# Patient Record
Sex: FEMALE | Race: WHITE | NOT HISPANIC OR LATINO | URBAN - METROPOLITAN AREA
[De-identification: names, ages, dates, MRNs, and addresses within clinical notes are randomized per-mention and may not be internally consistent; named-entity substitution may affect disease eponyms.]

---

## 2017-05-26 ENCOUNTER — EMERGENCY (EMERGENCY)
Facility: HOSPITAL | Age: 77
LOS: 1 days | Discharge: HOME | End: 2017-05-26
Attending: EMERGENCY MEDICINE | Admitting: EMERGENCY MEDICINE
Payer: MEDICARE

## 2017-05-26 VITALS
HEART RATE: 75 BPM | TEMPERATURE: 98 F | OXYGEN SATURATION: 99 % | RESPIRATION RATE: 18 BRPM | SYSTOLIC BLOOD PRESSURE: 120 MMHG | DIASTOLIC BLOOD PRESSURE: 56 MMHG

## 2017-05-26 VITALS
SYSTOLIC BLOOD PRESSURE: 123 MMHG | TEMPERATURE: 99 F | RESPIRATION RATE: 16 BRPM | OXYGEN SATURATION: 98 % | HEART RATE: 83 BPM | DIASTOLIC BLOOD PRESSURE: 73 MMHG

## 2017-05-26 DIAGNOSIS — R10.32 LEFT LOWER QUADRANT PAIN: ICD-10-CM

## 2017-05-26 LAB
ALBUMIN SERPL ELPH-MCNC: 4.3 G/DL — SIGNIFICANT CHANGE UP (ref 3.3–5)
ALP SERPL-CCNC: 46 U/L — SIGNIFICANT CHANGE UP (ref 40–120)
ALT FLD-CCNC: 12 U/L RC — SIGNIFICANT CHANGE UP (ref 10–45)
ANION GAP SERPL CALC-SCNC: 17 MMOL/L — SIGNIFICANT CHANGE UP (ref 5–17)
APPEARANCE UR: CLEAR — SIGNIFICANT CHANGE UP
AST SERPL-CCNC: 16 U/L — SIGNIFICANT CHANGE UP (ref 10–40)
BASOPHILS # BLD AUTO: 0 K/UL — SIGNIFICANT CHANGE UP (ref 0–0.2)
BASOPHILS NFR BLD AUTO: 0.2 % — SIGNIFICANT CHANGE UP (ref 0–2)
BILIRUB SERPL-MCNC: 1.8 MG/DL — HIGH (ref 0.2–1.2)
BILIRUB UR-MCNC: NEGATIVE — SIGNIFICANT CHANGE UP
BUN SERPL-MCNC: 21 MG/DL — SIGNIFICANT CHANGE UP (ref 7–23)
CALCIUM SERPL-MCNC: 9.6 MG/DL — SIGNIFICANT CHANGE UP (ref 8.4–10.5)
CHLORIDE SERPL-SCNC: 97 MMOL/L — SIGNIFICANT CHANGE UP (ref 96–108)
CO2 SERPL-SCNC: 22 MMOL/L — SIGNIFICANT CHANGE UP (ref 22–31)
COLOR SPEC: SIGNIFICANT CHANGE UP
CREAT SERPL-MCNC: 1.37 MG/DL — HIGH (ref 0.5–1.3)
DIFF PNL FLD: ABNORMAL
EOSINOPHIL # BLD AUTO: 0.1 K/UL — SIGNIFICANT CHANGE UP (ref 0–0.5)
EOSINOPHIL NFR BLD AUTO: 0.6 % — SIGNIFICANT CHANGE UP (ref 0–6)
GAS PNL BLDV: SIGNIFICANT CHANGE UP
GLUCOSE SERPL-MCNC: 92 MG/DL — SIGNIFICANT CHANGE UP (ref 70–99)
GLUCOSE UR QL: NEGATIVE — SIGNIFICANT CHANGE UP
HCT VFR BLD CALC: 31 % — LOW (ref 34.5–45)
HGB BLD-MCNC: 10.9 G/DL — LOW (ref 11.5–15.5)
KETONES UR-MCNC: ABNORMAL
LEUKOCYTE ESTERASE UR-ACNC: NEGATIVE — SIGNIFICANT CHANGE UP
LYMPHOCYTES # BLD AUTO: 19.6 % — SIGNIFICANT CHANGE UP (ref 13–44)
LYMPHOCYTES # BLD AUTO: 2 K/UL — SIGNIFICANT CHANGE UP (ref 1–3.3)
MCHC RBC-ENTMCNC: 35.1 GM/DL — SIGNIFICANT CHANGE UP (ref 32–36)
MCHC RBC-ENTMCNC: 36.7 PG — HIGH (ref 27–34)
MCV RBC AUTO: 105 FL — HIGH (ref 80–100)
MONOCYTES # BLD AUTO: 0.7 K/UL — SIGNIFICANT CHANGE UP (ref 0–0.9)
MONOCYTES NFR BLD AUTO: 7.2 % — SIGNIFICANT CHANGE UP (ref 2–14)
NEUTROPHILS # BLD AUTO: 7.2 K/UL — SIGNIFICANT CHANGE UP (ref 1.8–7.4)
NEUTROPHILS NFR BLD AUTO: 72.4 % — SIGNIFICANT CHANGE UP (ref 43–77)
NITRITE UR-MCNC: NEGATIVE — SIGNIFICANT CHANGE UP
PH UR: 5.5 — SIGNIFICANT CHANGE UP (ref 5–8)
PLATELET # BLD AUTO: 253 K/UL — SIGNIFICANT CHANGE UP (ref 150–400)
POTASSIUM SERPL-MCNC: 4 MMOL/L — SIGNIFICANT CHANGE UP (ref 3.5–5.3)
POTASSIUM SERPL-SCNC: 4 MMOL/L — SIGNIFICANT CHANGE UP (ref 3.5–5.3)
PROT SERPL-MCNC: 7.2 G/DL — SIGNIFICANT CHANGE UP (ref 6–8.3)
PROT UR-MCNC: NEGATIVE — SIGNIFICANT CHANGE UP
RBC # BLD: 2.97 M/UL — LOW (ref 3.8–5.2)
RBC # FLD: 14.3 % — SIGNIFICANT CHANGE UP (ref 10.3–14.5)
RBC CASTS # UR COMP ASSIST: SIGNIFICANT CHANGE UP /HPF (ref 0–2)
SODIUM SERPL-SCNC: 136 MMOL/L — SIGNIFICANT CHANGE UP (ref 135–145)
SP GR SPEC: 1.01 — LOW (ref 1.01–1.02)
UROBILINOGEN FLD QL: NEGATIVE — SIGNIFICANT CHANGE UP
WBC # BLD: 10 K/UL — SIGNIFICANT CHANGE UP (ref 3.8–10.5)
WBC # FLD AUTO: 10 K/UL — SIGNIFICANT CHANGE UP (ref 3.8–10.5)
WBC UR QL: SIGNIFICANT CHANGE UP /HPF (ref 0–5)

## 2017-05-26 PROCEDURE — 71010: CPT | Mod: 26

## 2017-05-26 PROCEDURE — 82435 ASSAY OF BLOOD CHLORIDE: CPT

## 2017-05-26 PROCEDURE — 99284 EMERGENCY DEPT VISIT MOD MDM: CPT

## 2017-05-26 PROCEDURE — 83690 ASSAY OF LIPASE: CPT

## 2017-05-26 PROCEDURE — 85027 COMPLETE CBC AUTOMATED: CPT

## 2017-05-26 PROCEDURE — 82803 BLOOD GASES ANY COMBINATION: CPT

## 2017-05-26 PROCEDURE — 80053 COMPREHEN METABOLIC PANEL: CPT

## 2017-05-26 PROCEDURE — 87086 URINE CULTURE/COLONY COUNT: CPT

## 2017-05-26 PROCEDURE — 74177 CT ABD & PELVIS W/CONTRAST: CPT

## 2017-05-26 PROCEDURE — 82947 ASSAY GLUCOSE BLOOD QUANT: CPT

## 2017-05-26 PROCEDURE — 81001 URINALYSIS AUTO W/SCOPE: CPT

## 2017-05-26 PROCEDURE — 83605 ASSAY OF LACTIC ACID: CPT

## 2017-05-26 PROCEDURE — 84132 ASSAY OF SERUM POTASSIUM: CPT

## 2017-05-26 PROCEDURE — 99284 EMERGENCY DEPT VISIT MOD MDM: CPT | Mod: 25

## 2017-05-26 PROCEDURE — 82330 ASSAY OF CALCIUM: CPT

## 2017-05-26 PROCEDURE — 74177 CT ABD & PELVIS W/CONTRAST: CPT | Mod: 26

## 2017-05-26 PROCEDURE — 85014 HEMATOCRIT: CPT

## 2017-05-26 PROCEDURE — 84295 ASSAY OF SERUM SODIUM: CPT

## 2017-05-26 PROCEDURE — 71045 X-RAY EXAM CHEST 1 VIEW: CPT

## 2017-05-26 RX ORDER — SODIUM CHLORIDE 9 MG/ML
1000 INJECTION INTRAMUSCULAR; INTRAVENOUS; SUBCUTANEOUS ONCE
Qty: 0 | Refills: 0 | Status: COMPLETED | OUTPATIENT
Start: 2017-05-26 | End: 2017-05-26

## 2017-05-26 RX ADMIN — Medication 1 TABLET(S): at 20:22

## 2017-05-26 RX ADMIN — SODIUM CHLORIDE 2000 MILLILITER(S): 9 INJECTION INTRAMUSCULAR; INTRAVENOUS; SUBCUTANEOUS at 17:19

## 2017-05-26 NOTE — ED PROVIDER NOTE - OBJECTIVE STATEMENT
76F with pmh microscopic hematuria, renal cancer s/p R nephrectomy (2009), htn, diverticulitis 10 yr ago p/w 3d h/o abdominal pain.  Pain is located LLQ, dull, non radiating, intermittent, w/w movement.  A/w decreased appetite.  Denies diarrhea, constipation, hematochezia, melena, n/v, fever, chills, cp, sob, dysuria, hematuria.  Went to  for pain 3d ago, d/w diverticulitis clinically and t/w ciproflagyl.  Here today because pain has not persisted.  PMD concerned may have perforation.      PMD: Fifi Alfaro [phone]

## 2017-05-26 NOTE — ED PROVIDER NOTE - PROGRESS NOTE DETAILS
Symptoms mild, did not require pain Rx throughout ED course.  CT A/P results noted.  Will change abx to augmentin and refer back to primary medical doctor.  Strict return precautions discussed.  D/C.  --BMM

## 2017-05-26 NOTE — ED ADULT NURSE NOTE - OBJECTIVE STATEMENT
76 y.o female presents to the ed c.o abdominal pain s.p treatment for diagnosed diverticulitis on antibiotics. pt began to have pain on tuesday, called pcp who told her to go to urgent care. diagnosed with diverticulitis, started on flagyl and cipro 500mg each since wednesday, she has taken 4 doses. pt has been eating liquids and drinking water, no solid food. patient hx of htn, hysterectomy, removed kidney in 09 for cancer, patient is unsure of the type of cancer she had. pt states she does not have abdominal pain unless the site is palpated or if she walks. denies chest pain/sob/fever/chills/n/v/d/denies urinary symptoms. family at the bedside patient is no distress, denies pain at rest. Patient undressed and placed into gown, call bell in hand and side rails up for safety. warm blanket provided, vital signs stable, pt in no acute distress.

## 2017-05-26 NOTE — ED PROVIDER NOTE - PLAN OF CARE
1) You were here for abdominal pain.    2) Take your augmentin medicine as prescribed.   3) Follow up with your primary doctor for further evaluation and to answer any questions you have.    4) Return to the emergency department if you experience worsening symptoms, pain, fever, chills, nausea, vomiting or other concerning symptoms.

## 2017-05-26 NOTE — ED PROVIDER NOTE - CARE PLAN
Principal Discharge DX:	Diverticulitis of large intestine without perforation or abscess without bleeding Principal Discharge DX:	Diverticulitis of large intestine without perforation or abscess without bleeding  Instructions for follow-up, activity and diet:	1) You were here for abdominal pain.    2) Take your augmentin medicine as prescribed.   3) Follow up with your primary doctor for further evaluation and to answer any questions you have.    4) Return to the emergency department if you experience worsening symptoms, pain, fever, chills, nausea, vomiting or other concerning symptoms.

## 2017-05-26 NOTE — ED ADULT TRIAGE NOTE - CHIEF COMPLAINT QUOTE
LLQ abd pain since tuesday went to her PMD and sent here to r/o diverticulitis or perf patient already on flagyl and cipro from urgent care

## 2017-05-26 NOTE — ED ADULT NURSE REASSESSMENT NOTE - NS ED NURSE REASSESS COMMENT FT1
Patient aaox4 no acute distress no pain or NVD. Updated on plan of care. Awaiting dispo.
Patient aaox4 no acute distress noted. Patient d/c in stable condition. D/c plan reviewed. Reports understanding of d/c plan.
pt in ct scan.
pt is waiting for ct results, in no acute distress and family remains at the bedside. smiling and watching tv with family.     Pt resting comfortably with VSS, no complaints at this time. Patient's bed in the lowest position, explained plan of care to patient and family members. Will continue to monitor.

## 2017-05-26 NOTE — ED PROVIDER NOTE - MEDICAL DECISION MAKING DETAILS
76F with past medical history and hpi as noted.  LUQ tenderness to palpation with some guarding.  Vitals within normal limits, afebrile.  Will obtain labs, ua, uclx, ct, ivf, reeval. 76F with past medical history and hpi as noted.  LUQ tenderness to palpation with some guarding.  Vitals within normal limits, afebrile.  Will obtain labs, ua, uclx, ct, ivf, reeval.  Attending Statement: Agree with the above.  Clinical diverticulitis.  Stable patient.  Declining pain Rx.  Well appearing.  ?rigors? at home.  CT to eval for abscess/perf.  Reassess.  --BMM

## 2017-05-28 LAB
CULTURE RESULTS: NO GROWTH — SIGNIFICANT CHANGE UP
SPECIMEN SOURCE: SIGNIFICANT CHANGE UP

## 2017-07-27 RX ORDER — RAMIPRIL 5 MG
1 CAPSULE ORAL
Qty: 0 | Refills: 0 | COMMUNITY

## 2017-07-27 RX ORDER — AMLODIPINE BESYLATE 2.5 MG/1
1 TABLET ORAL
Qty: 0 | Refills: 0 | COMMUNITY

## 2019-06-04 ENCOUNTER — APPOINTMENT (OUTPATIENT)
Dept: PULMONOLOGY | Facility: CLINIC | Age: 79
End: 2019-06-04

## 2021-12-03 PROBLEM — I10 ESSENTIAL (PRIMARY) HYPERTENSION: Chronic | Status: ACTIVE | Noted: 2017-05-26

## 2021-12-03 PROBLEM — C64.9 MALIGNANT NEOPLASM OF UNSPECIFIED KIDNEY, EXCEPT RENAL PELVIS: Chronic | Status: ACTIVE | Noted: 2017-05-26

## 2021-12-08 ENCOUNTER — APPOINTMENT (OUTPATIENT)
Dept: OTOLARYNGOLOGY | Facility: CLINIC | Age: 81
End: 2021-12-08
Payer: MEDICARE

## 2021-12-08 VITALS
WEIGHT: 182 LBS | HEART RATE: 82 BPM | DIASTOLIC BLOOD PRESSURE: 82 MMHG | BODY MASS INDEX: 36.69 KG/M2 | HEIGHT: 59 IN | SYSTOLIC BLOOD PRESSURE: 132 MMHG

## 2021-12-08 DIAGNOSIS — Z86.2 PERSONAL HISTORY OF DISEASES OF THE BLOOD AND BLOOD-FORMING ORGANS AND CERTAIN DISORDERS INVOLVING THE IMMUNE MECHANISM: ICD-10-CM

## 2021-12-08 DIAGNOSIS — Z82.2 FAMILY HISTORY OF DEAFNESS AND HEARING LOSS: ICD-10-CM

## 2021-12-08 DIAGNOSIS — Z86.79 PERSONAL HISTORY OF OTHER DISEASES OF THE CIRCULATORY SYSTEM: ICD-10-CM

## 2021-12-08 DIAGNOSIS — Z78.9 OTHER SPECIFIED HEALTH STATUS: ICD-10-CM

## 2021-12-08 DIAGNOSIS — Z83.3 FAMILY HISTORY OF DIABETES MELLITUS: ICD-10-CM

## 2021-12-08 DIAGNOSIS — Z80.9 FAMILY HISTORY OF MALIGNANT NEOPLASM, UNSPECIFIED: ICD-10-CM

## 2021-12-08 DIAGNOSIS — Z85.828 PERSONAL HISTORY OF OTHER MALIGNANT NEOPLASM OF SKIN: ICD-10-CM

## 2021-12-08 PROCEDURE — 99203 OFFICE O/P NEW LOW 30 MIN: CPT

## 2021-12-08 RX ORDER — MUPIROCIN 20 MG/G
2 OINTMENT TOPICAL 3 TIMES DAILY
Qty: 1 | Refills: 3 | Status: ACTIVE | COMMUNITY
Start: 2021-12-08 | End: 1900-01-01

## 2021-12-08 RX ORDER — LUSPATERCEPT 75 MG/1
INJECTION, POWDER, LYOPHILIZED, FOR SOLUTION SUBCUTANEOUS
Refills: 0 | Status: ACTIVE | COMMUNITY

## 2021-12-08 NOTE — PHYSICAL EXAM
[Hearing Silverio Test (Tuning Fork On Forehead)] : no lateralization of tone [Midline] : trachea located in midline position [Normal] : temporomandibular joint is normal [FreeTextEntry8] : cerumen removed by curettage [FreeTextEntry9] : cerumen removed by curettage [FreeTextEntry1] : no sinus tenderness, sensation intact, scab intranasally right lateral alar-removed, friability, upper lateral cartilage collapsing in [de-identified] : no tonsil tissue

## 2021-12-08 NOTE — REVIEW OF SYSTEMS
[Hearing Loss] : hearing loss [Lightheadedness] : lightheadedness [Ear Noises] : ear noises [Shortness Of Breath] : shortness of breath [Negative] : Heme/Lymph [FreeTextEntry6] : noisy breathing [de-identified] : muscle aches [FreeTextEntry1] : sweating at night

## 2021-12-08 NOTE — ASSESSMENT
[FreeTextEntry1] : Reviewed and reconciled meds, allergies, Pmhx, Famhx, Sochx, Surghx\par \par  hx of basal cell carcinoma on right side of nose . Now with right nasal sore\par \par Scab intranasally right lateral alar- removed.Friability, upper lateral cartilage collapsing in\par \par Plan\par Cerumen removed\par Will start Mupirocin TID. if does not heal will need to schedule removal \par F/u 3 weeks \par

## 2021-12-08 NOTE — HISTORY OF PRESENT ILLNESS
[de-identified] : 81 y.o female with h/o aortic stenosis. Going angiogram in 2 days and will require aortic valve replacement. She presents with a sore in right nostril. Its been there for past month or two. It did seem to crust up this week. Scab fell off which helped the pain. She had hx of basal cell carcinoma on right side of nose above where sore is. It was treated with Mohs procedure. Overall nose is a little dry and she noticed some blood in left nostril.

## 2022-02-01 ENCOUNTER — APPOINTMENT (OUTPATIENT)
Dept: OTOLARYNGOLOGY | Facility: CLINIC | Age: 82
End: 2022-02-01
Payer: MEDICARE

## 2022-02-01 VITALS
BODY MASS INDEX: 36.69 KG/M2 | DIASTOLIC BLOOD PRESSURE: 58 MMHG | WEIGHT: 182 LBS | HEIGHT: 59 IN | SYSTOLIC BLOOD PRESSURE: 106 MMHG | HEART RATE: 72 BPM

## 2022-02-01 DIAGNOSIS — H93.13 TINNITUS, BILATERAL: ICD-10-CM

## 2022-02-01 DIAGNOSIS — J34.89 OTHER SPECIFIED DISORDERS OF NOSE AND NASAL SINUSES: ICD-10-CM

## 2022-02-01 DIAGNOSIS — J98.8 OTHER SPECIFIED RESPIRATORY DISORDERS: ICD-10-CM

## 2022-02-01 DIAGNOSIS — M95.0 ACQUIRED DEFORMITY OF NOSE: ICD-10-CM

## 2022-02-01 DIAGNOSIS — R06.83 SNORING: ICD-10-CM

## 2022-02-01 DIAGNOSIS — H69.83 OTHER SPECIFIED DISORDERS OF EUSTACHIAN TUBE, BILATERAL: ICD-10-CM

## 2022-02-01 PROCEDURE — 92557 COMPREHENSIVE HEARING TEST: CPT

## 2022-02-01 PROCEDURE — 99214 OFFICE O/P EST MOD 30 MIN: CPT | Mod: 25

## 2022-02-01 PROCEDURE — 31575 DIAGNOSTIC LARYNGOSCOPY: CPT

## 2022-02-01 PROCEDURE — 92570 ACOUSTIC IMMITANCE TESTING: CPT

## 2022-02-01 PROCEDURE — 92588 EVOKED AUDITORY TST COMPLETE: CPT

## 2022-02-01 RX ORDER — CHROMIUM 200 MCG
TABLET ORAL
Refills: 0 | Status: COMPLETED | COMMUNITY
End: 2022-02-01

## 2022-02-01 RX ORDER — ASPIRIN 325 MG
81 TABLET ORAL
Refills: 0 | Status: ACTIVE | COMMUNITY

## 2022-02-01 RX ORDER — AZELASTINE HYDROCHLORIDE 137 UG/1
137 SPRAY, METERED NASAL TWICE DAILY
Qty: 1 | Refills: 5 | Status: ACTIVE | COMMUNITY
Start: 2022-02-01 | End: 1900-01-01

## 2022-02-01 NOTE — PHYSICAL EXAM
[Hearing Silverio Test (Tuning Fork On Forehead)] : no lateralization of tone [Midline] : trachea located in midline position [Normal] : no masses and lesions seen, face is symmetric [de-identified] : scarred, retracted TM. tympanosclerosis  [de-identified] : scarred, retracted TM. tympanosclerosis  [FreeTextEntry1] : where basal cell was, healed perfectly, scar from closure but no recurrence. septum midline. drop of upper lateral cartilages- right worse than left. no lesions or growths. dried crusting on right side.  [de-identified] : no tonsil tissue [de-identified] : type 3 oral cavity [FreeTextEntry2] : Sinuses nontender to percussion. Sensations intact.

## 2022-02-01 NOTE — HISTORY OF PRESENT ILLNESS
[de-identified] : The patient presents with h/o aortic stenosis s/p aortic valve replacement, basal cell carcinoma on right exterior of nose- treated with Mohs procedure, PMR- on Prednisone. Pt presents today for further evaluation of tinnitus. Pt c/o constant hissing noise in both ears and an intermittent pulsation sound in both ears, both of which have been occurring for years. Pt states that she notices the pulsation noise most when waking up.  Pt states that blood pressure is controlled with meds. Pt admits to snoring and possible stopping breathing while sleeping. Pt adds that her SOB has not improved after aortic valve replacement. She states that she was SOB just walking from her car into the building. Pt has not had carotid doppler done or MRI/MRA head done. Pt was last seen for a sore on inside right side of nose that resolved with Mupirocin.

## 2022-02-01 NOTE — PROCEDURE
[Unable to Cooperate with Mirror] : patient unable to cooperate with mirror [Complicated Symptoms] : complicated symptoms requiring more thorough examination than provided by mirror [None] : none [de-identified] : Procedure: Flexible Fiberoptic Laryngoscopy: Risks, benefits, and alternatives of flexible endoscopy were explained to the patient. The patient gave oral consent to proceed. The flexible scope was inserted into the left nasal cavity and advanced towards the nasopharynx. Visualized mucosa over the turbinates and septum were as describe above.  The nasopharynx was clear. Oropharyngeal walls were symmetric and mobile without lesion, mass, or edema. Hypopharynx was also without lesion or edema. Complete obstruction at the level of the soft palate. Larynx was mobile without lesions.  Edema post cricoid and intra arytenoid.  Epiglottis normal. True vocal folds were white without mass or lesion. Base of tongue was within normal limits. \par

## 2022-02-01 NOTE — ADDENDUM
[FreeTextEntry1] : Documented by Tramaine Ortiz acting as scribe for Dr. Chávez on 02/01/2022.\par \par All Medical record entries made by the Scribe were at my, Dr. Chávez, direction and personally dictated by me on 02/01/2022. I have reviewed the chart and agree that the record accurately reflects my personal performance of the history, physical exam, assessment and plan. I have also personally directed, reviewed, and agreed with the discharge instructions.

## 2022-02-01 NOTE — CONSULT LETTER
[Dear  ___] : Dear  [unfilled], [Courtesy Letter:] : I had the pleasure of seeing your patient, [unfilled], in my office today. [Please see my note below.] : Please see my note below. [Consult Closing:] : Thank you very much for allowing me to participate in the care of this patient.  If you have any questions, please do not hesitate to contact me. [Sincerely,] : Sincerely, [FreeTextEntry3] : Malcom Chávez MD FACS

## 2022-02-01 NOTE — ASSESSMENT
[FreeTextEntry1] : Reviewed and reconciled medications, allergies, PMHx, PSHx, SocHx, FMHx. \par \par h/o aortic stenosis s/p aortic valve replacement, basal cell carcinoma on right exterior of nose- treated with Mohs procedure, PMR- on Prednisone\par tinnitus\par pulsatile tinnitus\par snoring\par Complete obstruction at the level of the soft palate\par Edema post cricoid and intra arytenoid. \par b/l scarred, retracted TM with tympanosclerosis \par \par Audio: - type Ad tymps au(etf abnormal au)\par dpoae - absent au\par right: mild sloping to severe snhl\par left: mild sloping to mod-severe snhl\par right asymm noted at 8khz\par b/l 80% discrim at 80db\par \par Pt had bout of dizziness and SOB walking from audio to the room that has since resolved. \par \par Plan:\par Flexible Fiberoptic Laryngoscopy. Audio - results interpreted by Dr. Chávez and reviewed with the patient. Sleep study. Carotid doppler- if normal then MRI/MRA of head. Astelin - 2 sprays bilaterally BID, spray laterally. FU after test.

## 2022-02-07 ENCOUNTER — APPOINTMENT (OUTPATIENT)
Age: 82
End: 2022-02-07
Payer: MEDICARE

## 2022-02-07 ENCOUNTER — OUTPATIENT (OUTPATIENT)
Dept: OUTPATIENT SERVICES | Facility: HOSPITAL | Age: 82
LOS: 1 days | End: 2022-02-07
Payer: MEDICARE

## 2022-02-07 DIAGNOSIS — G47.33 OBSTRUCTIVE SLEEP APNEA (ADULT) (PEDIATRIC): ICD-10-CM

## 2022-02-07 PROCEDURE — 95806 SLEEP STUDY UNATT&RESP EFFT: CPT

## 2022-02-07 PROCEDURE — 95806 SLEEP STUDY UNATT&RESP EFFT: CPT | Mod: 26

## 2022-02-25 ENCOUNTER — APPOINTMENT (OUTPATIENT)
Dept: OTOLARYNGOLOGY | Facility: CLINIC | Age: 82
End: 2022-02-25
Payer: MEDICARE

## 2022-02-25 VITALS
BODY MASS INDEX: 36.69 KG/M2 | HEART RATE: 62 BPM | HEIGHT: 59 IN | SYSTOLIC BLOOD PRESSURE: 112 MMHG | DIASTOLIC BLOOD PRESSURE: 78 MMHG | WEIGHT: 182 LBS

## 2022-02-25 DIAGNOSIS — J31.0 CHRONIC RHINITIS: ICD-10-CM

## 2022-02-25 DIAGNOSIS — H93.A9 PULSATILE TINNITUS, UNSPECIFIED EAR: ICD-10-CM

## 2022-02-25 DIAGNOSIS — R09.02 HYPOXEMIA: ICD-10-CM

## 2022-02-25 DIAGNOSIS — R06.02 SHORTNESS OF BREATH: ICD-10-CM

## 2022-02-25 PROCEDURE — 99214 OFFICE O/P EST MOD 30 MIN: CPT

## 2022-02-25 RX ORDER — PREDNISONE 1 MG/1
1 TABLET ORAL
Refills: 0 | Status: ACTIVE | COMMUNITY

## 2022-02-25 NOTE — REASON FOR VISIT
[Subsequent Evaluation] : a subsequent evaluation for [FreeTextEntry2] : shortness of breath/ sleep apnea

## 2022-02-25 NOTE — ASSESSMENT
[FreeTextEntry1] : Reviewed and reconciled medications, allergies, PMHx, PSHx, SocHx, FMHx. \par \par s/p aortic valve replacement, basal cell carcinoma on right exterior of nose- treated with Mohs procedure, PMR- on Prednisone\par \par US doppler carotid arteries bilateral 02/01/2022 - \par normal, mild nonocclusive calcifies plaque in the right carotid bulb. \par \par sleep study 02/07/2022 - \par mild sleep apnea \par 9.6 times per hour - stopped breathing\par lowest oxygen level 70% \par needs auto PAP\par \par Plan:\par US and sleep study reports discussed. auto PAP machine required. MRI and MRA ordered again. Discussed r/b/a of tracheostomy. continue using astelin. Try losing weight. FU after results \par \par

## 2022-02-25 NOTE — PHYSICAL EXAM
[Midline] : trachea located in midline position [Removed] : palatine tonsils previously removed [Normal] : no masses and lesions seen, face is symmetric [Hearing Loss Right Only] : normal [Hearing Loss Left Only] : normal [FreeTextEntry8] : cerumen impaction removed via curettage  [FreeTextEntry9] : cerumen removed via curettage  [FreeTextEntry5] : no response to tuning fork [FreeTextEntry1] : deviated septum to the left\par polyp coming back  [de-identified] : mild uvula edema   [FreeTextEntry2] : sinuses nontender to percussion

## 2022-02-25 NOTE — ADDENDUM
[FreeTextEntry1] : Documented by Tye Walton acting as scribe for Dr. Chávez on 02/25/2022. \par \par All Medical record entries made by the scribe were at my. Dr. Chávez direction and personally dictated by me on 02/25/2022. I have reviewed the chart and agree that the record accurately reflects my personal performance of the history, physical exam, assessment and plan. I have also personally directed, reviewed, and agreed with the discharge instructions.

## 2022-02-25 NOTE — HISTORY OF PRESENT ILLNESS
[de-identified] : The patient presents with s/p aortic valve replacement, basal cell carcinoma on right exterior of nose- treated with Mohs procedure, PMR- on Prednisone. The patient presents today to discuss sleep study and US results. Pt reports still feeling shortness of breath despite the valve replacement. Pt denies going for MRI and MRA since last visit since no one gave her the prescription. Pt reports still using astelin and it is helping a little. Pt states going to [place] soon and will start using auto PAP after shes back. Pt reports having tinnitus and wants to know if hearing aids will help with that. \par

## 2022-02-28 ENCOUNTER — APPOINTMENT (OUTPATIENT)
Dept: OTOLARYNGOLOGY | Facility: CLINIC | Age: 82
End: 2022-02-28

## 2022-11-03 ENCOUNTER — NON-APPOINTMENT (OUTPATIENT)
Age: 82
End: 2022-11-03

## 2022-11-07 ENCOUNTER — APPOINTMENT (OUTPATIENT)
Dept: OTOLARYNGOLOGY | Facility: CLINIC | Age: 82
End: 2022-11-07

## 2022-11-07 VITALS
BODY MASS INDEX: 37.5 KG/M2 | WEIGHT: 186 LBS | SYSTOLIC BLOOD PRESSURE: 130 MMHG | DIASTOLIC BLOOD PRESSURE: 73 MMHG | HEART RATE: 65 BPM | HEIGHT: 59 IN

## 2022-11-07 DIAGNOSIS — G47.33 OBSTRUCTIVE SLEEP APNEA (ADULT) (PEDIATRIC): ICD-10-CM

## 2022-11-07 PROCEDURE — 99213 OFFICE O/P EST LOW 20 MIN: CPT

## 2022-11-07 RX ORDER — AMOXICILLIN 500 MG/1
500 CAPSULE ORAL
Qty: 20 | Refills: 0 | Status: DISCONTINUED | COMMUNITY
Start: 2022-06-27

## 2022-11-07 RX ORDER — NIRMATRELVIR AND RITONAVIR 300-100 MG
20 X 150 MG & KIT ORAL
Qty: 20 | Refills: 0 | Status: DISCONTINUED | COMMUNITY
Start: 2022-05-15

## 2022-11-07 RX ORDER — PSYLLIUM SEED
PACKET (EA) ORAL
Refills: 0 | Status: ACTIVE | COMMUNITY

## 2022-11-07 NOTE — REVIEW OF SYSTEMS
[Seasonal Allergies] : seasonal allergies [Hearing Loss] : hearing loss [As Noted in HPI] : as noted in HPI [Negative] : Head and Neck

## 2022-11-07 NOTE — HISTORY OF PRESENT ILLNESS
[de-identified] : 82 yr old female dx mild AISHA 2/2022, was advised to start auto-PAP, but never did.\par needs to have knee surgery, surgeon wants her to start on auto-PAP prior to surgery

## 2024-01-29 ENCOUNTER — APPOINTMENT (OUTPATIENT)
Dept: GASTROENTEROLOGY | Facility: CLINIC | Age: 84
End: 2024-01-29
Payer: MEDICARE

## 2024-01-29 VITALS
OXYGEN SATURATION: 97 % | DIASTOLIC BLOOD PRESSURE: 73 MMHG | HEIGHT: 59 IN | SYSTOLIC BLOOD PRESSURE: 146 MMHG | BODY MASS INDEX: 37.5 KG/M2 | WEIGHT: 186 LBS | HEART RATE: 85 BPM | RESPIRATION RATE: 16 BRPM

## 2024-01-29 PROCEDURE — 99204 OFFICE O/P NEW MOD 45 MIN: CPT

## 2024-03-28 NOTE — PHYSICAL EXAM
[Alert] : alert [Normal Voice/Communication] : normal voice/communication [Healthy Appearing] : healthy appearing [No Acute Distress] : no acute distress [Sclera] : the sclera and conjunctiva were normal [Normal Appearance] : the appearance of the neck was normal [No Respiratory Distress] : no respiratory distress [Abdomen Tenderness] : non-tender [Abdomen Soft] : soft [Oriented To Time, Place, And Person] : oriented to person, place, and time

## 2024-03-28 NOTE — HISTORY OF PRESENT ILLNESS
[FreeTextEntry1] : Ms Lopez is an 83-year-old woman with history of MDA with recurrent diverticulitis (most recently in 10/2023) aortic stenosis s/p TAVR with known small bowel AVMs and anemia sent by Dr. Moon to establish care.  Pt presented to [facility] in 6/2023 following a capsule endoscopy which showed small bowel with multiple active bleeding sites throughout the small bowel.  The patient had initially presented with melena and worsening anemia. Small bowel enteroscopy revealed no active bleeding nor identifiable source of GI blood loss.  She has since been well without overt blood loss. Her most recent Hgb was 10 g/dL on 1/24/2024 which is relatively stable for her and she receives Aranesp (follows with hematology).

## 2024-03-28 NOTE — ASSESSMENT
[FreeTextEntry1] : Ms. Lopez is an 83 year old woman with multifactorial anemia with known AVMs and GI bleed in 6/2023 with positive wireless capsule endoscopy.  The patient has been stable since that time with iron supplementation and Aranesp.  We discussed options for treating small bowel bleeding and AVMs.  Considering she has been well without further evidence of GI blood loss and stable H/H we have discussed watchful waiting.  If something changes and she develops overt GI blood loss or worsening H/H will then proceed with an balloon assisted enteroscopy.  Pt to contact us if any changes to her clinical condition with ongoing monitoring of her H/H.

## 2024-10-03 ENCOUNTER — APPOINTMENT (OUTPATIENT)
Dept: GASTROENTEROLOGY | Facility: CLINIC | Age: 84
End: 2024-10-03

## 2024-10-03 DIAGNOSIS — K55.20 ANGIODYSPLASIA OF COLON W/OUT HEMORRHAGE: ICD-10-CM

## 2024-10-03 PROCEDURE — 99443: CPT | Mod: 93

## 2024-11-06 ENCOUNTER — OUTPATIENT (OUTPATIENT)
Dept: OUTPATIENT SERVICES | Facility: HOSPITAL | Age: 84
LOS: 1 days | End: 2024-11-06
Payer: MEDICARE

## 2024-11-06 VITALS
RESPIRATION RATE: 18 BRPM | OXYGEN SATURATION: 98 % | HEIGHT: 58 IN | HEART RATE: 84 BPM | SYSTOLIC BLOOD PRESSURE: 134 MMHG | DIASTOLIC BLOOD PRESSURE: 80 MMHG | WEIGHT: 158.95 LBS | TEMPERATURE: 98 F

## 2024-11-06 DIAGNOSIS — K55.20 ANGIODYSPLASIA OF COLON WITHOUT HEMORRHAGE: ICD-10-CM

## 2024-11-06 DIAGNOSIS — Z98.890 OTHER SPECIFIED POSTPROCEDURAL STATES: Chronic | ICD-10-CM

## 2024-11-06 DIAGNOSIS — Z96.652 PRESENCE OF LEFT ARTIFICIAL KNEE JOINT: Chronic | ICD-10-CM

## 2024-11-06 DIAGNOSIS — Z90.89 ACQUIRED ABSENCE OF OTHER ORGANS: Chronic | ICD-10-CM

## 2024-11-06 DIAGNOSIS — T14.8XXA OTHER INJURY OF UNSPECIFIED BODY REGION, INITIAL ENCOUNTER: Chronic | ICD-10-CM

## 2024-11-06 DIAGNOSIS — D46.9 MYELODYSPLASTIC SYNDROME, UNSPECIFIED: ICD-10-CM

## 2024-11-06 DIAGNOSIS — Z01.818 ENCOUNTER FOR OTHER PREPROCEDURAL EXAMINATION: ICD-10-CM

## 2024-11-06 LAB
ANION GAP SERPL CALC-SCNC: 13 MMOL/L — SIGNIFICANT CHANGE UP (ref 5–17)
BUN SERPL-MCNC: 30 MG/DL — HIGH (ref 7–23)
CALCIUM SERPL-MCNC: 10 MG/DL — SIGNIFICANT CHANGE UP (ref 8.4–10.5)
CHLORIDE SERPL-SCNC: 103 MMOL/L — SIGNIFICANT CHANGE UP (ref 96–108)
CO2 SERPL-SCNC: 23 MMOL/L — SIGNIFICANT CHANGE UP (ref 22–31)
CREAT SERPL-MCNC: 1.03 MG/DL — SIGNIFICANT CHANGE UP (ref 0.5–1.3)
EGFR: 54 ML/MIN/1.73M2 — LOW
GLUCOSE SERPL-MCNC: 109 MG/DL — HIGH (ref 70–99)
HCT VFR BLD CALC: 28.6 % — LOW (ref 34.5–45)
HGB BLD-MCNC: 9 G/DL — LOW (ref 11.5–15.5)
MCHC RBC-ENTMCNC: 29.7 PG — SIGNIFICANT CHANGE UP (ref 27–34)
MCHC RBC-ENTMCNC: 31.5 G/DL — LOW (ref 32–36)
MCV RBC AUTO: 94.4 FL — SIGNIFICANT CHANGE UP (ref 80–100)
NRBC # BLD: 0 /100 WBCS — SIGNIFICANT CHANGE UP (ref 0–0)
PLATELET # BLD AUTO: 227 K/UL — SIGNIFICANT CHANGE UP (ref 150–400)
POTASSIUM SERPL-MCNC: 4 MMOL/L — SIGNIFICANT CHANGE UP (ref 3.5–5.3)
POTASSIUM SERPL-SCNC: 4 MMOL/L — SIGNIFICANT CHANGE UP (ref 3.5–5.3)
RBC # BLD: 3.03 M/UL — LOW (ref 3.8–5.2)
RBC # FLD: 24.7 % — HIGH (ref 10.3–14.5)
SODIUM SERPL-SCNC: 139 MMOL/L — SIGNIFICANT CHANGE UP (ref 135–145)
WBC # BLD: 5.14 K/UL — SIGNIFICANT CHANGE UP (ref 3.8–10.5)
WBC # FLD AUTO: 5.14 K/UL — SIGNIFICANT CHANGE UP (ref 3.8–10.5)

## 2024-11-06 NOTE — H&P PST ADULT - NSWEIGHTCALCTOOLDRUG_GEN_A_CORE
Patient knows that she needs to get her wisdom teeth out. 3 days ago she started getting pain on the left side of her mouth. Patient is very swollen on the left side of the month causing an asymmetrical face. Patient reports that there is an abscess and she has maxed out on OTC pain control      Triage Assessment (Adult)       Row Name 09/20/24 6826          Triage Assessment    Airway WDL WDL        Respiratory WDL    Respiratory WDL WDL        Skin Circulation/Temperature WDL    Skin Circulation/Temperature WDL WDL        Cardiac WDL    Cardiac WDL WDL        Peripheral/Neurovascular WDL    Peripheral Neurovascular WDL WDL        Cognitive/Neuro/Behavioral WDL    Cognitive/Neuro/Behavioral WDL WDL                       used
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
## 2024-11-06 NOTE — H&P PST ADULT - OTHER CARE PROVIDERS
Dr Rocco Durant (Cards) [phone]; Dr Carol Gomez (Hem) [phone]; Dr Ankit Reed (Neph) [phone]; Dr Landen Amato (Pulm) [phone]

## 2024-11-06 NOTE — H&P PST ADULT - HISTORY OF PRESENT ILLNESS
84 year old woman with multifactorial anemia with known AVMs and GI bleed in 6/2023 with positive wireless capsule endoscopy presents to [facility] today for a scheduled EGD w/ Enteroscopy on 11/14/204. The patient has been stable since that time with iron supplementation and Aranesp. PMH also includes HTN, Kidney CA (s/p nephrectomy), MDA (followed by Hem), hx of AS (s/p TAVR, followed by [facility] Q6[facility]), Gout, GERD and AISHA (states never used the machine). Denies recent fevers, chills, cough, chest pain or SOB and feels well overall.  84 year old female with multifactorial anemia with known AVMs and GI bleed in 6/2023 with positive wireless capsule endoscopy and presents to [facility] today for a scheduled EGD w/ Enteroscopy on 11/14/204. The patient has been stable since that time with iron supplementation and Aranesp. PMH also includes HTN, Kidney CA (s/p nephrectomy), MDA (followed by Hem), hx of AS (s/p TAVR, followed by [facility] Q6mo), Gout, GERD and AISHA (states mild, never used the machine). Denies recent fevers, chills, cough, chest pain or SOB and feels well overall.

## 2024-11-14 ENCOUNTER — APPOINTMENT (OUTPATIENT)
Dept: GASTROENTEROLOGY | Facility: HOSPITAL | Age: 84
End: 2024-11-14

## 2024-11-14 ENCOUNTER — TRANSCRIPTION ENCOUNTER (OUTPATIENT)
Age: 84
End: 2024-11-14

## 2024-11-14 ENCOUNTER — INPATIENT (INPATIENT)
Facility: HOSPITAL | Age: 84
LOS: 3 days | Discharge: HOME | DRG: 205 | End: 2024-11-18
Attending: STUDENT IN AN ORGANIZED HEALTH CARE EDUCATION/TRAINING PROGRAM | Admitting: INTERNAL MEDICINE
Payer: MEDICARE

## 2024-11-14 VITALS
WEIGHT: 164.02 LBS | HEART RATE: 67 BPM | HEIGHT: 58 IN | OXYGEN SATURATION: 97 % | TEMPERATURE: 98 F | RESPIRATION RATE: 20 BRPM | SYSTOLIC BLOOD PRESSURE: 141 MMHG | DIASTOLIC BLOOD PRESSURE: 64 MMHG

## 2024-11-14 DIAGNOSIS — Z96.652 PRESENCE OF LEFT ARTIFICIAL KNEE JOINT: Chronic | ICD-10-CM

## 2024-11-14 DIAGNOSIS — J18.9 PNEUMONIA, UNSPECIFIED ORGANISM: ICD-10-CM

## 2024-11-14 DIAGNOSIS — K55.20 ANGIODYSPLASIA OF COLON WITHOUT HEMORRHAGE: ICD-10-CM

## 2024-11-14 DIAGNOSIS — Z98.890 OTHER SPECIFIED POSTPROCEDURAL STATES: Chronic | ICD-10-CM

## 2024-11-14 DIAGNOSIS — Z90.89 ACQUIRED ABSENCE OF OTHER ORGANS: Chronic | ICD-10-CM

## 2024-11-14 DIAGNOSIS — T14.8XXA OTHER INJURY OF UNSPECIFIED BODY REGION, INITIAL ENCOUNTER: Chronic | ICD-10-CM

## 2024-11-14 LAB
ALBUMIN SERPL ELPH-MCNC: 4.1 G/DL — SIGNIFICANT CHANGE UP (ref 3.3–5)
ALP SERPL-CCNC: 58 U/L — SIGNIFICANT CHANGE UP (ref 40–120)
ALT FLD-CCNC: 5 U/L — LOW (ref 10–45)
ANION GAP SERPL CALC-SCNC: 12 MMOL/L — SIGNIFICANT CHANGE UP (ref 5–17)
AST SERPL-CCNC: 10 U/L — SIGNIFICANT CHANGE UP (ref 10–40)
BILIRUB SERPL-MCNC: 1.4 MG/DL — HIGH (ref 0.2–1.2)
BLD GP AB SCN SERPL QL: NEGATIVE — SIGNIFICANT CHANGE UP
BUN SERPL-MCNC: 21 MG/DL — SIGNIFICANT CHANGE UP (ref 7–23)
CALCIUM SERPL-MCNC: 8.9 MG/DL — SIGNIFICANT CHANGE UP (ref 8.4–10.5)
CHLORIDE SERPL-SCNC: 105 MMOL/L — SIGNIFICANT CHANGE UP (ref 96–108)
CO2 SERPL-SCNC: 22 MMOL/L — SIGNIFICANT CHANGE UP (ref 22–31)
CREAT SERPL-MCNC: 0.98 MG/DL — SIGNIFICANT CHANGE UP (ref 0.5–1.3)
EGFR: 58 ML/MIN/1.73M2 — LOW
GLUCOSE SERPL-MCNC: 121 MG/DL — HIGH (ref 70–99)
HCT VFR BLD CALC: 28.1 % — LOW (ref 34.5–45)
HGB BLD-MCNC: 8.8 G/DL — LOW (ref 11.5–15.5)
MAGNESIUM SERPL-MCNC: 1.7 MG/DL — SIGNIFICANT CHANGE UP (ref 1.6–2.6)
MCHC RBC-ENTMCNC: 30.6 PG — SIGNIFICANT CHANGE UP (ref 27–34)
MCHC RBC-ENTMCNC: 31.3 G/DL — LOW (ref 32–36)
MCV RBC AUTO: 97.6 FL — SIGNIFICANT CHANGE UP (ref 80–100)
NRBC # BLD: 2 /100 WBCS — HIGH (ref 0–0)
PHOSPHATE SERPL-MCNC: 4.1 MG/DL — SIGNIFICANT CHANGE UP (ref 2.5–4.5)
PLATELET # BLD AUTO: 299 K/UL — SIGNIFICANT CHANGE UP (ref 150–400)
POTASSIUM SERPL-MCNC: 4.1 MMOL/L — SIGNIFICANT CHANGE UP (ref 3.5–5.3)
POTASSIUM SERPL-SCNC: 4.1 MMOL/L — SIGNIFICANT CHANGE UP (ref 3.5–5.3)
PROCALCITONIN SERPL-MCNC: 0.1 NG/ML — SIGNIFICANT CHANGE UP (ref 0.02–0.1)
PROT SERPL-MCNC: 6.2 G/DL — SIGNIFICANT CHANGE UP (ref 6–8.3)
RBC # BLD: 2.88 M/UL — LOW (ref 3.8–5.2)
RBC # FLD: 25 % — HIGH (ref 10.3–14.5)
RH IG SCN BLD-IMP: POSITIVE — SIGNIFICANT CHANGE UP
SODIUM SERPL-SCNC: 139 MMOL/L — SIGNIFICANT CHANGE UP (ref 135–145)
WBC # BLD: 8.38 K/UL — SIGNIFICANT CHANGE UP (ref 3.8–10.5)
WBC # FLD AUTO: 8.38 K/UL — SIGNIFICANT CHANGE UP (ref 3.8–10.5)

## 2024-11-14 PROCEDURE — 71045 X-RAY EXAM CHEST 1 VIEW: CPT | Mod: 26

## 2024-11-14 PROCEDURE — 99497 ADVNCD CARE PLAN 30 MIN: CPT | Mod: 25

## 2024-11-14 PROCEDURE — 99223 1ST HOSP IP/OBS HIGH 75: CPT

## 2024-11-14 RX ORDER — ACETAMINOPHEN 500MG 500 MG/1
650 TABLET, COATED ORAL EVERY 6 HOURS
Refills: 0 | Status: DISCONTINUED | OUTPATIENT
Start: 2024-11-14 | End: 2024-11-18

## 2024-11-14 RX ORDER — ALLOPURINOL 300 MG/1
300 TABLET ORAL DAILY
Refills: 0 | Status: DISCONTINUED | OUTPATIENT
Start: 2024-11-15 | End: 2024-11-18

## 2024-11-14 RX ORDER — AZITHROMYCIN 250 MG/1
500 TABLET, FILM COATED ORAL ONCE
Refills: 0 | Status: COMPLETED | OUTPATIENT
Start: 2024-11-14 | End: 2024-11-14

## 2024-11-14 RX ORDER — PANTOPRAZOLE SODIUM 40 MG/1
1 TABLET, DELAYED RELEASE ORAL
Refills: 0 | DISCHARGE

## 2024-11-14 RX ORDER — CEFTRIAXONE SODIUM 1 G
1000 VIAL (EA) INJECTION EVERY 24 HOURS
Refills: 0 | Status: DISCONTINUED | OUTPATIENT
Start: 2024-11-15 | End: 2024-11-16

## 2024-11-14 RX ORDER — CEFTRIAXONE SODIUM 1 G
1000 VIAL (EA) INJECTION ONCE
Refills: 0 | Status: COMPLETED | OUTPATIENT
Start: 2024-11-14 | End: 2024-11-14

## 2024-11-14 RX ORDER — CHOLECALCIFEROL (VITAMIN D3) 625 MCG
1 CAPSULE ORAL
Refills: 0 | DISCHARGE

## 2024-11-14 RX ORDER — PANTOPRAZOLE SODIUM 40 MG/1
40 TABLET, DELAYED RELEASE ORAL
Refills: 0 | Status: DISCONTINUED | OUTPATIENT
Start: 2024-11-14 | End: 2024-11-18

## 2024-11-14 RX ORDER — ACETAMINOPHEN 500 MG
2 TABLET ORAL
Refills: 0 | DISCHARGE

## 2024-11-14 RX ORDER — CEFTRIAXONE SODIUM 1 G
VIAL (EA) INJECTION
Refills: 0 | Status: DISCONTINUED | OUTPATIENT
Start: 2024-11-15 | End: 2024-11-16

## 2024-11-14 RX ORDER — ALLOPURINOL 100 MG
1 TABLET ORAL
Refills: 0 | DISCHARGE

## 2024-11-14 RX ORDER — ACETAMINOPHEN 500MG 500 MG/1
1000 TABLET, COATED ORAL ONCE
Refills: 0 | Status: COMPLETED | OUTPATIENT
Start: 2024-11-14 | End: 2024-11-14

## 2024-11-14 RX ORDER — IPRATROPIUM BROMIDE AND ALBUTEROL SULFATE 2.5; .5 MG/3ML; MG/3ML
3 SOLUTION RESPIRATORY (INHALATION) ONCE
Refills: 0 | Status: COMPLETED | OUTPATIENT
Start: 2024-11-14 | End: 2024-11-14

## 2024-11-14 RX ORDER — IPRATROPIUM BROMIDE AND ALBUTEROL SULFATE 2.5; .5 MG/3ML; MG/3ML
3 SOLUTION RESPIRATORY (INHALATION) EVERY 6 HOURS
Refills: 0 | Status: DISCONTINUED | OUTPATIENT
Start: 2024-11-14 | End: 2024-11-18

## 2024-11-14 RX ORDER — AZITHROMYCIN 250 MG/1
TABLET, FILM COATED ORAL
Refills: 0 | Status: DISCONTINUED | OUTPATIENT
Start: 2024-11-14 | End: 2024-11-16

## 2024-11-14 RX ORDER — CHOLECALCIFEROL (VITAMIN D3) 10MCG/0.25
2000 DROPS ORAL DAILY
Refills: 0 | Status: DISCONTINUED | OUTPATIENT
Start: 2024-11-14 | End: 2024-11-18

## 2024-11-14 RX ORDER — DARBEPOETIN ALFA 40 UG/.4ML
500 INJECTION, SOLUTION INTRAVENOUS; SUBCUTANEOUS
Refills: 0 | DISCHARGE

## 2024-11-14 RX ORDER — AMLODIPINE BESYLATE 10 MG/1
5 TABLET ORAL DAILY
Refills: 0 | Status: DISCONTINUED | OUTPATIENT
Start: 2024-11-15 | End: 2024-11-18

## 2024-11-14 RX ORDER — DOXAZOSIN MESYLATE 8 MG
1 TABLET ORAL
Refills: 0 | DISCHARGE

## 2024-11-14 RX ORDER — AZITHROMYCIN 250 MG/1
500 TABLET, FILM COATED ORAL EVERY 24 HOURS
Refills: 0 | Status: DISCONTINUED | OUTPATIENT
Start: 2024-11-15 | End: 2024-11-16

## 2024-11-14 RX ORDER — DOXAZOSIN MESYLATE 1 MG
2 TABLET ORAL AT BEDTIME
Refills: 0 | Status: DISCONTINUED | OUTPATIENT
Start: 2024-11-14 | End: 2024-11-18

## 2024-11-14 RX ADMIN — IPRATROPIUM BROMIDE AND ALBUTEROL SULFATE 3 MILLILITER(S): 2.5; .5 SOLUTION RESPIRATORY (INHALATION) at 17:30

## 2024-11-14 RX ADMIN — ACETAMINOPHEN 500MG 400 MILLIGRAM(S): 500 TABLET, COATED ORAL at 20:25

## 2024-11-14 NOTE — ASU PATIENT PROFILE, ADULT - FALL HARM RISK - HARM RISK INTERVENTIONS

## 2024-11-14 NOTE — GOALS OF CARE CONVERSATION - ADVANCED CARE PLANNING - CONVERSATION DETAILS
Patient is an independent 83 y/o F with a history of RCC S/P RIGHT nephrectomy in 2009 presumed to be in remission, MDS, PMR, gout, past TAVR in 2021 at [place] for [place], reported AISHA not on positive pressure ventilation at home, with S/P enteroscopy for APC for recurrent bleeding AVM with post endoscopy hypoxia and chills, receiving dose of IV Lasix in the PACU and suspected aspiration pneumonia.    The patient wishes to maintain FULL Cardiopulmonary support and wishes no limitation in medical intervention.   Son in attendance concurs and defers to patient's decision.    I have spent a total of 16 minutes reviewing Advance Care planning with the patient.

## 2024-11-14 NOTE — H&P ADULT - NSHPSOURCEINFOTX_GEN_ALL_CORE
Reviewed Medex with patient via patient recall with Medex from GI note.   Atif Chilel, [phone] in attendance with patient's permission.

## 2024-11-14 NOTE — H&P ADULT - ASSESSMENT
NIGHT HOSPITALIST:     Admitted from the PACU following enteroscopy for recurrent bleeding AVM S/P APC with hypoxaemia and chills, rigors with the patient receiving dose of Lasix 20 mg IV x 1 in the PACU by report (patient is reluctant for maintenance doses of Lasix with patient's history of solitary kidney) but patient agrees to IV Rocephin and IV Zithromax for empiric treatment of pneumonia.   Will continue with Duoneb therapy and patient/son agrees to a noncontrast CTT chest to clarify the LLL increased markings.    Will upgrade to telemetry--patient moved to 6MON, given known TAVR by history.   Will check BNP with patient deferring maintenance Lasix for now.   HS troponin.  Echo.    Would follow Cr and would consider notifying patient's Nephrologist at [place] of patient's admission.    Will continue with patients' Norvasc and doxazosin for essential HTN.    GI following post procedure for APC of AVM.    KELLY for now with GI bleeding risk precludes pharmacologic DVT prophylaxis.
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
## 2024-11-14 NOTE — ASU DISCHARGE PLAN (ADULT/PEDIATRIC) - FINANCIAL ASSISTANCE
[facility] provides services at a reduced cost to those who are determined to be eligible through [facility]’s financial assistance program. Information regarding [facility]’s financial assistance program can be found by going to [email or web address] or by calling [phone].

## 2024-11-14 NOTE — GOALS OF CARE CONVERSATION - ADVANCED CARE PLANNING - LOCATION OF DISCUSSION
Face to face Plan: Pt. was called after appointment concluded, as she had not been properly registered in the computer.  She is not able to start her accutane at this time.  Her registration window will be 30 days long and she will begin her mother's \"left over\" accutane.  She stated that her mother had accutane capsules left from her previous treatment. Detail Level: Zone

## 2024-11-14 NOTE — H&P ADULT - NSHPADDITIONALINFOADULT_GEN_ALL_CORE
NIGHT HOSPITALIST:    Patient / son in attendance aware of course and agrees with plan/care as above.   Given patient's comorbidities, patient's long term prognosis is guarded.   Emotional support provided to patient/ son.   The patient wishes to maintain FULL CODE status and wishes no limitation in medical intervention.   Care reviewed with covering NP/PA for endorsement to my physician colleagues in the AM.    Timothy Emerson MD  Available on Microsoft Teams.

## 2024-11-14 NOTE — H&P ADULT - PROBLEM SELECTOR PLAN 3
Would follow Cr and would consider notifying patient's Nephrologist at [facility] of patient's admission.

## 2024-11-14 NOTE — CHART NOTE - NSCHARTNOTEFT_GEN_A_CORE
85yo F hx of MDS, AS s/p TAVR, c/b ,multiple  small bowel AVMs presented today for enteroscopy for management of AVMs in setting of episode of melena and anemia 1 month ago. Enteroscopy today with APC of multiple small bowel AVMs, tolerated procedure well. In PACU during recovery patient was noted to have O2 saturation ranging from 85-87% after episodes of emesis, initial interventions included O2NC to 2L and, incentive spirometry and nebulizer treatments, O2 sat with minimal improvement  after interventions and required NC to increase to 4L. PE reveals b/l crackles at the bases, patient given IV lasix 20mg while in PACU. After discussion with anesthesia team it was recommended for patient to be admitted for monitoring in setting of low O2 sat after episodes of emesis not responding to duonebs, spirometry and dose of lasix. Discussed with hospitalist Dr. Baires.     Advanced GI team to continue to follow
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
## 2024-11-14 NOTE — H&P ADULT - NSHPLABSRESULTS_GEN_ALL_CORE
Chest radiograph interpreted by me with LLL infiltrate.    EKG tracing interpreted by me with NSR at 89 with isolated Q III<    WBC 8.38  (no differential--patient declined repeat CBC tonight with differential)    Hgb 8.8    Platelets of 299K    Random glucose of 121.    Cr 0.98    K+ 4.1    Procalcitonin nondiagnostic.    Mg++ 1.7>>repeated before resuming IV Mg++ supplementation by the PACU.    Echo report from [facility] in 2023 with TAVR in place with 65% EF.

## 2024-11-14 NOTE — H&P ADULT - HISTORY OF PRESENT ILLNESS
NIGHT HOSPITALIST:   Patient UNKNOWN to me previously, assigned to me at this point via Dr. Lan of GI to admit this independent 85 y/o F--patient with a history of RCC S/P past RIGHT nephrectomy in 2009 presumed to be in remission, MDS, PMR, gout, past transcatheter aortic valve replacement in 2021 at [facility] for AS, reported AISHA but not on nighttime CPAP, (office note from Dr. Lan reviewed by me from 1/29/24),   Patient with past capsule endoscopy at [facility] in June 2023 with SB with multiple bleeding sites throughout the SB, with SB enteroscopy at the time with no active bleeding source, history of essential HTN on Norvasc and doxazosin (latter at bedtime), presumed GERD on a PPI, with patient for outpatient enteroscopy for management of AVM today, with S/P argon plasma coagulation with tolerance of procedure.   Apparently in the Post Anaesthesia Care Unit with patient with emesis, with chills, rigors, and noted to have increasing requirements of supplemental oxygen until 95% on 4 L/min, with patient apparently receiving Lasix 20 mg IV x1 in the PACU and parenteral Tylenol.    Patient then transferred to [facility] with chest radiograph reviewed by me with a LLL infiltrate.  Patient notes subjective improvement in symptoms but patient is averse to another dose of IV Lasix due to patient's concerns given her solitary kidney status.   Patient denies chest pain/pressure.   NO palpitations.   No abdominal pain, no red blood per rectum or melena.   NO back pain, no tearing back pain.  NO N/V.   No HA no focal weakness.

## 2024-11-14 NOTE — H&P ADULT - NSHPSOCIALHISTORY_GEN_ALL_CORE
No tobacco or ethanol use.    Supportive adult son and daughters.   Retired, previously managed a check processing company.

## 2024-11-14 NOTE — GOALS OF CARE CONVERSATION - ADVANCED CARE PLANNING - TREATMENT GUIDELINE COMMENT
The patient wishes to maintain FULL Cardiopulmonary support and wishes no limitation in medical intervention.   Son in attendance concurs and defers to patient's decision.

## 2024-11-14 NOTE — H&P ADULT - NSHPOUTPATIENTPROVIDERS_GEN_ALL_CORE
Dianelys Harris MD (PCP) [phone]  Darshan Lan MD (GI) [phone]  Rocco Durant MD (Cardiology) [phone]  Carol Gomez MD (Hematology)   Ankit Reed MD (Nephrology) [phone]  Landen Amato MD (Pulmonary) [phone]

## 2024-11-14 NOTE — H&P ADULT - NSHPREVIEWOFSYSTEMS_GEN_ALL_CORE
NO HA, no focal weakness.  NO chest pain/pressure.   No palpitations.  No breast symptoms.  NO abdominal pain, no red blood per rectum or melena.  No back pain, no tearing back pain.    NO rash.  NO vaginal bleeding.  No thyroid symptoms.  NO node swelling.  NO SI/HI>    Patient reports COVID-19 vaccine x 3 (declines further booster jabs to date) and with COVID-19 infection in 2023 but declined oral Rx at home (presumably Paxlovid).

## 2024-11-14 NOTE — H&P ADULT - NSHPPHYSICALEXAM_GEN_ALL_CORE
74.4 kg   BMI 34.3      Afebrile.     HR  86    RR 14   /68    97% on 4 L/min   Occasional APC on telemetry

## 2024-11-14 NOTE — ASU DISCHARGE PLAN (ADULT/PEDIATRIC) - NS MD DC FALL RISK RISK
For information on Fall & Injury Prevention, visit: [email or web address] OR  [email or web address] OR  https://www.cdc.gov/steadi/patient.html

## 2024-11-14 NOTE — H&P ADULT - PROBLEM SELECTOR PLAN 1
Admitted from the PACU following enteroscopy for recurrent bleeding AVM S/P APC with hypoxaemia and chills, rigors with the patient receiving dose of Lasix 20 mg IV x 1 in the PACU by report (patient is reluctant for maintenance doses of Lasix with patient's history of solitary kidney) but patient agrees to IV Rocephin and IV Zithromax for empiric treatment of pneumonia.   Will continue with Duoneb therapy.       Patient wishes to maintain her PO clear fluid intake.  Aspiration precautions.    Patient/son agrees to a noncontrast CTT chest to clarify the LLL increased markings.

## 2024-11-14 NOTE — H&P ADULT - PROBLEM SELECTOR PLAN 7
Transitions of Care Status:  1.  Name of PCP:    Dianelys Harris MD (PCP) [phone]  2.  PCP Contacted on Admission: [ ] Y    [ x] N    3.  PCP contacted at Discharge: [ ] Y    [ ] N    [ ] N/A  4.  Post-Discharge Appointment Date and Location:  5.  Summary of Handoff given to PCP:

## 2024-11-14 NOTE — H&P ADULT - PROBLEM SELECTOR PLAN 2
Will upgrade to telemetry--patient moved to 6MON, given known TAVR by history.   Will check BNP with patient deferring maintenance Lasix for now.   HS troponin.  Echo.

## 2024-11-15 ENCOUNTER — RESULT REVIEW (OUTPATIENT)
Age: 84
End: 2024-11-15

## 2024-11-15 DIAGNOSIS — Q27.30 ARTERIOVENOUS MALFORMATION, SITE UNSPECIFIED: ICD-10-CM

## 2024-11-15 DIAGNOSIS — I10 ESSENTIAL (PRIMARY) HYPERTENSION: ICD-10-CM

## 2024-11-15 DIAGNOSIS — J96.01 ACUTE RESPIRATORY FAILURE WITH HYPOXIA: ICD-10-CM

## 2024-11-15 DIAGNOSIS — Z29.9 ENCOUNTER FOR PROPHYLACTIC MEASURES, UNSPECIFIED: ICD-10-CM

## 2024-11-15 DIAGNOSIS — Z90.5 ACQUIRED ABSENCE OF KIDNEY: ICD-10-CM

## 2024-11-15 DIAGNOSIS — Z75.8 OTHER PROBLEMS RELATED TO MEDICAL FACILITIES AND OTHER HEALTH CARE: ICD-10-CM

## 2024-11-15 DIAGNOSIS — R19.8 OTHER SPECIFIED SYMPTOMS AND SIGNS INVOLVING THE DIGESTIVE SYSTEM AND ABDOMEN: ICD-10-CM

## 2024-11-15 LAB
ALBUMIN SERPL ELPH-MCNC: 3.5 G/DL — SIGNIFICANT CHANGE UP (ref 3.3–5)
ALP SERPL-CCNC: 48 U/L — SIGNIFICANT CHANGE UP (ref 40–120)
ALT FLD-CCNC: <5 U/L — LOW (ref 10–45)
ANION GAP SERPL CALC-SCNC: 16 MMOL/L — SIGNIFICANT CHANGE UP (ref 5–17)
APPEARANCE UR: CLEAR — SIGNIFICANT CHANGE UP
AST SERPL-CCNC: 9 U/L — LOW (ref 10–40)
BACTERIA # UR AUTO: NEGATIVE /HPF — SIGNIFICANT CHANGE UP
BASOPHILS # BLD AUTO: 0 K/UL — SIGNIFICANT CHANGE UP (ref 0–0.2)
BASOPHILS NFR BLD AUTO: 0 % — SIGNIFICANT CHANGE UP (ref 0–2)
BILIRUB SERPL-MCNC: 1.6 MG/DL — HIGH (ref 0.2–1.2)
BILIRUB UR-MCNC: NEGATIVE — SIGNIFICANT CHANGE UP
BUN SERPL-MCNC: 23 MG/DL — SIGNIFICANT CHANGE UP (ref 7–23)
CALCIUM SERPL-MCNC: 8.5 MG/DL — SIGNIFICANT CHANGE UP (ref 8.4–10.5)
CAST: 0 /LPF — SIGNIFICANT CHANGE UP (ref 0–4)
CHLORIDE SERPL-SCNC: 98 MMOL/L — SIGNIFICANT CHANGE UP (ref 96–108)
CO2 SERPL-SCNC: 19 MMOL/L — LOW (ref 22–31)
COLOR SPEC: YELLOW — SIGNIFICANT CHANGE UP
CREAT SERPL-MCNC: 1.26 MG/DL — SIGNIFICANT CHANGE UP (ref 0.5–1.3)
DIFF PNL FLD: NEGATIVE — SIGNIFICANT CHANGE UP
EGFR: 43 ML/MIN/1.73M2 — LOW
ELLIPTOCYTES BLD QL SMEAR: SLIGHT — SIGNIFICANT CHANGE UP
EOSINOPHIL # BLD AUTO: 0 K/UL — SIGNIFICANT CHANGE UP (ref 0–0.5)
EOSINOPHIL NFR BLD AUTO: 0 % — SIGNIFICANT CHANGE UP (ref 0–6)
FLUAV AG NPH QL: SIGNIFICANT CHANGE UP
FLUBV AG NPH QL: SIGNIFICANT CHANGE UP
GIANT PLATELETS BLD QL SMEAR: PRESENT — SIGNIFICANT CHANGE UP
GLUCOSE SERPL-MCNC: 98 MG/DL — SIGNIFICANT CHANGE UP (ref 70–99)
GLUCOSE UR QL: NEGATIVE MG/DL — SIGNIFICANT CHANGE UP
HCT VFR BLD CALC: 24.5 % — LOW (ref 34.5–45)
HGB BLD-MCNC: 7.5 G/DL — LOW (ref 11.5–15.5)
HYPOCHROMIA BLD QL: SLIGHT — SIGNIFICANT CHANGE UP
KETONES UR-MCNC: NEGATIVE MG/DL — SIGNIFICANT CHANGE UP
LEUKOCYTE ESTERASE UR-ACNC: ABNORMAL
LYMPHOCYTES # BLD AUTO: 0.57 K/UL — LOW (ref 1–3.3)
LYMPHOCYTES # BLD AUTO: 5.8 % — LOW (ref 13–44)
MAGNESIUM SERPL-MCNC: 1.4 MG/DL — LOW (ref 1.6–2.6)
MAGNESIUM SERPL-MCNC: 2.2 MG/DL — SIGNIFICANT CHANGE UP (ref 1.6–2.6)
MANUAL SMEAR VERIFICATION: SIGNIFICANT CHANGE UP
MCHC RBC-ENTMCNC: 30.4 PG — SIGNIFICANT CHANGE UP (ref 27–34)
MCHC RBC-ENTMCNC: 30.6 G/DL — LOW (ref 32–36)
MCV RBC AUTO: 99.2 FL — SIGNIFICANT CHANGE UP (ref 80–100)
MONOCYTES # BLD AUTO: 0 K/UL — SIGNIFICANT CHANGE UP (ref 0–0.9)
MONOCYTES NFR BLD AUTO: 0 % — LOW (ref 2–14)
MRSA PCR RESULT.: SIGNIFICANT CHANGE UP
NEUTROPHILS # BLD AUTO: 9.32 K/UL — HIGH (ref 1.8–7.4)
NEUTROPHILS NFR BLD AUTO: 82.7 % — HIGH (ref 43–77)
NEUTS BAND # BLD: 11.5 % — HIGH (ref 0–8)
NITRITE UR-MCNC: NEGATIVE — SIGNIFICANT CHANGE UP
NRBC # BLD: 2 /100 WBCS — HIGH (ref 0–0)
NT-PROBNP SERPL-SCNC: 375 PG/ML — HIGH (ref 0–300)
OVALOCYTES BLD QL SMEAR: SLIGHT — SIGNIFICANT CHANGE UP
PH UR: 5 — SIGNIFICANT CHANGE UP (ref 5–8)
PHOSPHATE SERPL-MCNC: 4.5 MG/DL — SIGNIFICANT CHANGE UP (ref 2.5–4.5)
PLAT MORPH BLD: NORMAL — SIGNIFICANT CHANGE UP
PLATELET # BLD AUTO: 242 K/UL — SIGNIFICANT CHANGE UP (ref 150–400)
POIKILOCYTOSIS BLD QL AUTO: SLIGHT — SIGNIFICANT CHANGE UP
POLYCHROMASIA BLD QL SMEAR: SLIGHT — SIGNIFICANT CHANGE UP
POTASSIUM SERPL-MCNC: 4.3 MMOL/L — SIGNIFICANT CHANGE UP (ref 3.5–5.3)
POTASSIUM SERPL-SCNC: 4.3 MMOL/L — SIGNIFICANT CHANGE UP (ref 3.5–5.3)
PROT SERPL-MCNC: 5.6 G/DL — LOW (ref 6–8.3)
PROT UR-MCNC: NEGATIVE MG/DL — SIGNIFICANT CHANGE UP
RBC # BLD: 2.47 M/UL — LOW (ref 3.8–5.2)
RBC # FLD: 25.4 % — HIGH (ref 10.3–14.5)
RBC BLD AUTO: ABNORMAL
RBC CASTS # UR COMP ASSIST: 0 /HPF — SIGNIFICANT CHANGE UP (ref 0–4)
RSV RNA NPH QL NAA+NON-PROBE: SIGNIFICANT CHANGE UP
S AUREUS DNA NOSE QL NAA+PROBE: SIGNIFICANT CHANGE UP
SARS-COV-2 RNA SPEC QL NAA+PROBE: SIGNIFICANT CHANGE UP
SCHISTOCYTES BLD QL AUTO: SLIGHT — SIGNIFICANT CHANGE UP
SODIUM SERPL-SCNC: 133 MMOL/L — LOW (ref 135–145)
SP GR SPEC: 1.01 — SIGNIFICANT CHANGE UP (ref 1–1.03)
SQUAMOUS # UR AUTO: 0 /HPF — SIGNIFICANT CHANGE UP (ref 0–5)
TROPONIN T, HIGH SENSITIVITY RESULT: 27 NG/L — SIGNIFICANT CHANGE UP (ref 0–51)
UROBILINOGEN FLD QL: 0.2 MG/DL — SIGNIFICANT CHANGE UP (ref 0.2–1)
WBC # BLD: 9.89 K/UL — SIGNIFICANT CHANGE UP (ref 3.8–10.5)
WBC # FLD AUTO: 9.89 K/UL — SIGNIFICANT CHANGE UP (ref 3.8–10.5)
WBC UR QL: 1 /HPF — SIGNIFICANT CHANGE UP (ref 0–5)

## 2024-11-15 PROCEDURE — 99221 1ST HOSP IP/OBS SF/LOW 40: CPT | Mod: GC

## 2024-11-15 PROCEDURE — 71250 CT THORAX DX C-: CPT | Mod: 26

## 2024-11-15 PROCEDURE — 99232 SBSQ HOSP IP/OBS MODERATE 35: CPT

## 2024-11-15 PROCEDURE — 93306 TTE W/DOPPLER COMPLETE: CPT | Mod: 26

## 2024-11-15 RX ORDER — LIDOCAINE 40 MG/G
1 CREAM TOPICAL DAILY
Refills: 0 | Status: DISCONTINUED | OUTPATIENT
Start: 2024-11-15 | End: 2024-11-18

## 2024-11-15 RX ADMIN — ACETAMINOPHEN 500MG 1000 MILLIGRAM(S): 500 TABLET, COATED ORAL at 01:23

## 2024-11-15 RX ADMIN — IPRATROPIUM BROMIDE AND ALBUTEROL SULFATE 3 MILLILITER(S): 2.5; .5 SOLUTION RESPIRATORY (INHALATION) at 00:22

## 2024-11-15 RX ADMIN — Medication 2 MILLIGRAM(S): at 00:22

## 2024-11-15 RX ADMIN — PANTOPRAZOLE SODIUM 40 MILLIGRAM(S): 40 TABLET, DELAYED RELEASE ORAL at 06:11

## 2024-11-15 RX ADMIN — ALLOPURINOL 300 MILLIGRAM(S): 300 TABLET ORAL at 12:32

## 2024-11-15 RX ADMIN — Medication 100 MILLIGRAM(S): at 22:02

## 2024-11-15 RX ADMIN — AMLODIPINE BESYLATE 5 MILLIGRAM(S): 10 TABLET ORAL at 06:11

## 2024-11-15 RX ADMIN — IPRATROPIUM BROMIDE AND ALBUTEROL SULFATE 3 MILLILITER(S): 2.5; .5 SOLUTION RESPIRATORY (INHALATION) at 12:31

## 2024-11-15 RX ADMIN — Medication 100 MILLIGRAM(S): at 00:22

## 2024-11-15 RX ADMIN — Medication 2 MILLIGRAM(S): at 22:02

## 2024-11-15 RX ADMIN — Medication 25 GRAM(S): at 04:02

## 2024-11-15 RX ADMIN — Medication 2000 UNIT(S): at 12:32

## 2024-11-15 RX ADMIN — IPRATROPIUM BROMIDE AND ALBUTEROL SULFATE 3 MILLILITER(S): 2.5; .5 SOLUTION RESPIRATORY (INHALATION) at 17:17

## 2024-11-15 RX ADMIN — AZITHROMYCIN 255 MILLIGRAM(S): 250 TABLET, FILM COATED ORAL at 22:02

## 2024-11-15 RX ADMIN — IPRATROPIUM BROMIDE AND ALBUTEROL SULFATE 3 MILLILITER(S): 2.5; .5 SOLUTION RESPIRATORY (INHALATION) at 06:11

## 2024-11-15 RX ADMIN — AZITHROMYCIN 255 MILLIGRAM(S): 250 TABLET, FILM COATED ORAL at 00:22

## 2024-11-15 NOTE — PROGRESS NOTE ADULT - PROBLEM SELECTOR PLAN 1
- With hypoxia in PACU s/p enteroscopy  - CXR with LLL PNA  - C/w CTX, azithro, check strep ag, urine legionella, COVID/influenza/RSV  - Supplemental O2 as needed, c/w duonebs for now  - Incentive spirometry - With hypoxia in PACU s/p enteroscopy  - CXR with LLL PNA, possible aspiration in setting of emesis post procedure  - C/w CTX, azithro, check strep ag, urine legionella, COVID/influenza/RSV  - Supplemental O2 as needed, c/w duonebs for now  - Incentive spirometry

## 2024-11-15 NOTE — PATIENT PROFILE ADULT - NSPROGENBLOODRESTRICT_GEN_A_NUR
RX refill request sent via e-scribe from [facility] pharmacy on [address] [facility] [address] [facility]. Requesting a refill on Amlodipine 10 mg tablet, QTY 90 with 1 refill sent in.     Verified pt's chart, no changes at this time.     Next appointment scheduled on 10/6/22 with Maame Tracy    
none

## 2024-11-15 NOTE — PATIENT PROFILE ADULT - HOW PATIENT ADDRESSED, PROFILE
[facility], [place]    Need for Home Care Service was communicated by Lisbeth MARTINEZ on 6/1/22.  Choice was offered and patient chose [facility].    Spoke with Terry via PC regarding [facility] services. The patient will benefit from home care services based on symptom and medication management, and therapy eval.    Wound Care Instructions:   Right lower leg: Wash leg with soap and water.  Rinse well and pat dry.  To opened blistered apply silvasorb gel to wound bed and place adaptic over it.  Cover with abd pad, and secure with Kerlix and tape. Second right toe: Wash leg with soap and water.  Rinse well and pat dry.  To opened blistered apply silvasorb gel to wound bed and place adaptic over it.  Cover with gauze, and secure with Kerlix and tape.      Risk of readmission is: 89% (based on Longitudinal Plan of Care score)    Homebound criteria was discussed in detail and Terry verbalizes understanding.   Patient meets homebound criteria because he requires one person assist to leave the home, risk for infection.     Verbal and written information  provided to patient along with [facility] contact information. Teach back demonstrated by patient.     Address, phone number, email, and insurance verified with patient as per [facility] records.       Patient's support system is family.    Active with a skilled home health agency prior to admission NO.   PCP verified with patient as SALAS HOWELL  Pt aware and agreeable that face coverings are to be worn by self and all household members during home care visits. yes    Liaison will continue to follow until discharge.   Anticipated dc date is Thursday, 6/2/22.     Sravani Rodríguez RN   Home Care Liaison  [facility]  [phone]      Ela

## 2024-11-15 NOTE — PATIENT PROFILE ADULT - FALL HARM RISK - HARM RISK INTERVENTIONS

## 2024-11-15 NOTE — CONSULT NOTE ADULT - ASSESSMENT
85yo F hx of MDS, AS s/p TAVR, c/b ,multiple  small bowel AVMs presented today for enteroscopy for management of AVMs in setting of episode of melena and anemia 1 month ago. Enteroscopy today with APC of multiple small bowel AVMs, tolerated procedure well. In PACU during recovery patient was noted to have O2 saturation ranging from 85-87% after episodes of emesis, initial interventions included O2NC to 2L and, incentive spirometry and nebulizer treatments, O2 sat with minimal improvement  after interventions and required NC to increase to 4L. Patient admitted for pneumonia post procedure     #Pneumonia- likely aspiration   Xray with patchy left mid and lower lung airspace opacities compatible with pneumonia.    #Melena/anemia   - APC of multiple small bowel AVMs    Recommendations:   - abx for pneumonia   - wean O2 as tolerated   - GI will continue to follow   - rest of care per primary team     All recommendations are tentative until note is attested by attending.     Briseyda Gamez, PGY-6  Gastroenterology/Hepatology Fellow  Available on Microsoft Teams  [phone] ([facility] Short Range Pager)  [phone] ([facility] Long Range Pager)    On Weekends/Holidays (All day) and Weekdays after 5 PM to 8AM:  For non-urgent consults, please email [email or web address] or [email or web address]  For emergent consults, please contact on call GI team.  See Amion schedule ([facility]), [employer] paging system ([facility]), or call hospital  ([facility])

## 2024-11-15 NOTE — PROGRESS NOTE ADULT - ASSESSMENT
84 y.o. F w/pmhx of RCC s/p R nephrectomy 2009, MDS, PMR, gout, AS s/p TAVR 2021, reported AISHA (not on CPAP), HTN, GERD, multiple small bowel AVMs here for outpatient enteroscopy/argon plasma coagulation for management of AVM 11/14. Post-procedure with emesis, chills, hypoxia, requiring 4L NC, thus admitted to medicine.

## 2024-11-15 NOTE — PROGRESS NOTE ADULT - SUBJECTIVE AND OBJECTIVE BOX
[facility] Division of Hospital Medicine  Manda Porter MD  Available on Teams Luiz    Patient is a 84y old  Female who presents with a chief complaint of Admitted following hypoxaemia following enteroscopy with chills, rigors. (15 Nov 2024 10:58)      SUBJECTIVE / OVERNIGHT EVENTS:  Patient seen and evaluated at bedside, with son present. Denies any f/c prior to procedure, SOB, CP, abd pain, N/V/D - states she's had no further n/v since procedure, still feels SOB, especially when ambulating along with cough.     ADDITIONAL REVIEW OF SYSTEMS: negative    MEDICATIONS  (STANDING):  albuterol/ipratropium for Nebulization 3 milliLiter(s) Nebulizer every 6 hours  allopurinol 300 milliGRAM(s) Oral daily  amLODIPine   Tablet 5 milliGRAM(s) Oral daily  azithromycin  IVPB      azithromycin  IVPB 500 milliGRAM(s) IV Intermittent every 24 hours  cefTRIAXone   IVPB 1000 milliGRAM(s) IV Intermittent every 24 hours  cefTRIAXone   IVPB      cholecalciferol 2000 Unit(s) Oral daily  doxazosin 2 milliGRAM(s) Oral at bedtime  lidocaine   4% Patch 1 Patch Transdermal daily  pantoprazole    Tablet 40 milliGRAM(s) Oral before breakfast    MEDICATIONS  (PRN):  acetaminophen     Tablet .. 650 milliGRAM(s) Oral every 6 hours PRN Temp greater or equal to 38C (100.4F), Mild Pain (1 - 3)      CAPILLARY BLOOD GLUCOSE        I&O's Summary    14 Nov 2024 07:01  -  15 Nov 2024 07:00  --------------------------------------------------------  IN: 0 mL / OUT: 200 mL / NET: -200 mL    15 Nov 2024 07:01  -  15 Nov 2024 13:55  --------------------------------------------------------  IN: 360 mL / OUT: 350 mL / NET: 10 mL        PHYSICAL EXAM:  Vital Signs Last 24 Hrs  T(C): 36.2 (15 Nov 2024 13:26), Max: 36.7 (14 Nov 2024 21:05)  T(F): 97.2 (15 Nov 2024 13:26), Max: 98 (14 Nov 2024 21:05)  HR: 80 (15 Nov 2024 13:26) (59 - 98)  BP: 107/60 (15 Nov 2024 13:26) (107/60 - 141/64)  BP(mean): --  RR: 18 (15 Nov 2024 13:26) (14 - 20)  SpO2: 95% (15 Nov 2024 13:26) (90% - 100%)    Parameters below as of 15 Nov 2024 13:26  Patient On (Oxygen Delivery Method): nasal cannula  O2 Flow (L/min): 4    CONSTITUTIONAL: Well-groomed, in no apparent distress  EYES: No conjunctival or scleral injection, non-icteric; PERRLA and symmetric  ENMT: Oral mucosa with moist membranes  RESPIRATORY: Breathing comfortably; lungs CTA without wheeze/rhonchi/rales  CARDIOVASCULAR: +S1S2, RRR, no M/G/R; pedal pulses full and symmetric; no lower extremity edema  GASTROINTESTINAL: No palpable masses or tenderness, +BS throughout, no rebound/guarding  MUSCULOSKELETAL: no digital clubbing or cyanosis; no bony spinal tenderness, no reproducible paraspinal tenderness   SKIN: No rashes or ulcers noted  NEUROLOGIC: CN II-XII intact; sensation intact in LEs b/l to light touch  PSYCHIATRIC: A+O x 3; mood and affect appropriat    LABS:                        7.5    9.89  )-----------( 242      ( 15 Nov 2024 07:15 )             24.5     11-15    133[L]  |  98  |  23  ----------------------------<  98  4.3   |  19[L]  |  1.26    Ca    8.5      15 Nov 2024 07:14  Phos  4.5     11-15  Mg     2.2     11-15    TPro  5.6[L]  /  Alb  3.5  /  TBili  1.6[H]  /  DBili  x   /  AST  9[L]  /  ALT  <5[L]  /  AlkPhos  48  11-15          Urinalysis Basic - ( 15 Nov 2024 07:14 )    Color: x / Appearance: x / SG: x / pH: x  Gluc: 98 mg/dL / Ketone: x  / Bili: x / Urobili: x   Blood: x / Protein: x / Nitrite: x   Leuk Esterase: x / RBC: x / WBC x   Sq Epi: x / Non Sq Epi: x / Bacteria: x          RADIOLOGY & ADDITIONAL TESTS:  Results Reviewed:   Imaging Personally Reviewed:  Electrocardiogram Personally Reviewed:    COORDINATION OF CARE:  Care Discussed with Consultants/Other Providers [Y/N]:  Prior or Outpatient Records Reviewed [Y/N]:
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
## 2024-11-15 NOTE — PHARMACOTHERAPY INTERVENTION NOTE - COMMENTS
Patient is being treated empirically with azithromycin for pneumonia. Recommended to order labs for Legionella Antigen, Urine Culture.     Jaden (Christiano Liz) Reji  Transitions of Care Pharmacist  Available on [employer] Teams (Preferred)  Spectra: [phone]

## 2024-11-15 NOTE — CONSULT NOTE ADULT - ATTENDING COMMENTS
As above.    Patient seen and examined in the early evening  Discussed with GI fellow earlier in the day.    Impression:    #1.  Consulted for duodenal and small bowel angiectasias, with history of melena in the past month with associated anemia, with hemoglobin 7-8 at this time.  Patient underwent upper endoscopy with push enteroscopy to mid jejunum on 11/14/2024, with argon plasma coagulation of multiple duodenal and proximal jejunal nonbleeding angiectasia's.    #2.  Patient admitted post enteroscopy for persistent hypoxemia, x-ray imaging and CT scan of the chest demonstrates predominantly left-sided multifocal pneumonia    #3.  History of aortic stenosis status post TAVR, echocardiogram demonstrates no congestive heart failure or TAVR malfunction    #4.  Myelodysplastic syndrome    Recommendations:    #1.  Follow CBC    #2.  Diet as tolerated    #3.  Antibiotics for pneumonia per primary team As above.    Patient seen and examined in the early evening  Discussed with GI fellow earlier in the day.    Impression:    #1.  Consulted for duodenal and small bowel angiectasias, with history of melena in the past month with associated anemia, with hemoglobin 7-8 at this time.  Patient underwent upper endoscopy with push enteroscopy to mid jejunum on 11/14/2024, with argon plasma coagulation of multiple duodenal and proximal jejunal nonbleeding angiectasia's.    #2.  Patient admitted post enteroscopy for persistent hypoxemia, x-ray imaging and CT scan of the chest demonstrates predominantly left-sided multifocal pneumonia    #3.  History of aortic stenosis status post TAVR, echocardiogram demonstrates no congestive heart failure or TAVR malfunction    #4.  Myelodysplastic syndrome    Recommendations:    #1.  Follow CBC    #2.  Diet as tolerated    #3.  Antibiotics for pneumonia per primary team    #4.  Pantoprazole 40 mg daily

## 2024-11-16 DIAGNOSIS — E04.1 NONTOXIC SINGLE THYROID NODULE: ICD-10-CM

## 2024-11-16 DIAGNOSIS — E27.9 DISORDER OF ADRENAL GLAND, UNSPECIFIED: ICD-10-CM

## 2024-11-16 LAB
ALBUMIN SERPL ELPH-MCNC: 3.4 G/DL — SIGNIFICANT CHANGE UP (ref 3.3–5)
ALP SERPL-CCNC: 49 U/L — SIGNIFICANT CHANGE UP (ref 40–120)
ALT FLD-CCNC: 6 U/L — LOW (ref 10–45)
ANION GAP SERPL CALC-SCNC: 12 MMOL/L — SIGNIFICANT CHANGE UP (ref 5–17)
AST SERPL-CCNC: 6 U/L — LOW (ref 10–40)
BILIRUB SERPL-MCNC: 1.1 MG/DL — SIGNIFICANT CHANGE UP (ref 0.2–1.2)
BUN SERPL-MCNC: 31 MG/DL — HIGH (ref 7–23)
CALCIUM SERPL-MCNC: 8.6 MG/DL — SIGNIFICANT CHANGE UP (ref 8.4–10.5)
CHLORIDE SERPL-SCNC: 101 MMOL/L — SIGNIFICANT CHANGE UP (ref 96–108)
CO2 SERPL-SCNC: 21 MMOL/L — LOW (ref 22–31)
CREAT SERPL-MCNC: 1.26 MG/DL — SIGNIFICANT CHANGE UP (ref 0.5–1.3)
EGFR: 43 ML/MIN/1.73M2 — LOW
GLUCOSE SERPL-MCNC: 98 MG/DL — SIGNIFICANT CHANGE UP (ref 70–99)
HCT VFR BLD CALC: 22 % — LOW (ref 34.5–45)
HGB BLD-MCNC: 7.1 G/DL — LOW (ref 11.5–15.5)
LEGIONELLA AG UR QL: NEGATIVE — SIGNIFICANT CHANGE UP
MCHC RBC-ENTMCNC: 31.3 PG — SIGNIFICANT CHANGE UP (ref 27–34)
MCHC RBC-ENTMCNC: 32.3 G/DL — SIGNIFICANT CHANGE UP (ref 32–36)
MCV RBC AUTO: 96.9 FL — SIGNIFICANT CHANGE UP (ref 80–100)
NRBC # BLD: 0 /100 WBCS — SIGNIFICANT CHANGE UP (ref 0–0)
NT-PROBNP SERPL-SCNC: 668 PG/ML — HIGH (ref 0–300)
PLATELET # BLD AUTO: 187 K/UL — SIGNIFICANT CHANGE UP (ref 150–400)
POTASSIUM SERPL-MCNC: 4.4 MMOL/L — SIGNIFICANT CHANGE UP (ref 3.5–5.3)
POTASSIUM SERPL-SCNC: 4.4 MMOL/L — SIGNIFICANT CHANGE UP (ref 3.5–5.3)
PROT SERPL-MCNC: 5.7 G/DL — LOW (ref 6–8.3)
RBC # BLD: 2.27 M/UL — LOW (ref 3.8–5.2)
RBC # FLD: 25.2 % — HIGH (ref 10.3–14.5)
S PNEUM AG UR QL: NEGATIVE — SIGNIFICANT CHANGE UP
SODIUM SERPL-SCNC: 134 MMOL/L — LOW (ref 135–145)
WBC # BLD: 7.88 K/UL — SIGNIFICANT CHANGE UP (ref 3.8–10.5)
WBC # FLD AUTO: 7.88 K/UL — SIGNIFICANT CHANGE UP (ref 3.8–10.5)

## 2024-11-16 PROCEDURE — 99232 SBSQ HOSP IP/OBS MODERATE 35: CPT

## 2024-11-16 PROCEDURE — 99232 SBSQ HOSP IP/OBS MODERATE 35: CPT | Mod: GC

## 2024-11-16 RX ORDER — PIPERACILLIN SODIUM AND TAZOBACTAM SODIUM 4; .5 G/20ML; G/20ML
3.38 INJECTION, POWDER, LYOPHILIZED, FOR SOLUTION INTRAVENOUS ONCE
Refills: 0 | Status: COMPLETED | OUTPATIENT
Start: 2024-11-16 | End: 2024-11-16

## 2024-11-16 RX ORDER — PIPERACILLIN SODIUM AND TAZOBACTAM SODIUM 4; .5 G/20ML; G/20ML
3.38 INJECTION, POWDER, LYOPHILIZED, FOR SOLUTION INTRAVENOUS ONCE
Refills: 0 | Status: COMPLETED | OUTPATIENT
Start: 2024-11-17 | End: 2024-11-17

## 2024-11-16 RX ORDER — PIPERACILLIN SODIUM AND TAZOBACTAM SODIUM 4; .5 G/20ML; G/20ML
3.38 INJECTION, POWDER, LYOPHILIZED, FOR SOLUTION INTRAVENOUS EVERY 8 HOURS
Refills: 0 | Status: DISCONTINUED | OUTPATIENT
Start: 2024-11-17 | End: 2024-11-18

## 2024-11-16 RX ADMIN — IPRATROPIUM BROMIDE AND ALBUTEROL SULFATE 3 MILLILITER(S): 2.5; .5 SOLUTION RESPIRATORY (INHALATION) at 06:01

## 2024-11-16 RX ADMIN — PANTOPRAZOLE SODIUM 40 MILLIGRAM(S): 40 TABLET, DELAYED RELEASE ORAL at 06:00

## 2024-11-16 RX ADMIN — Medication 2 MILLIGRAM(S): at 22:01

## 2024-11-16 RX ADMIN — LIDOCAINE 1 PATCH: 40 CREAM TOPICAL at 13:15

## 2024-11-16 RX ADMIN — PIPERACILLIN SODIUM AND TAZOBACTAM SODIUM 200 GRAM(S): 4; .5 INJECTION, POWDER, LYOPHILIZED, FOR SOLUTION INTRAVENOUS at 14:56

## 2024-11-16 RX ADMIN — ALLOPURINOL 300 MILLIGRAM(S): 300 TABLET ORAL at 13:14

## 2024-11-16 RX ADMIN — LIDOCAINE 1 PATCH: 40 CREAM TOPICAL at 19:22

## 2024-11-16 RX ADMIN — AMLODIPINE BESYLATE 5 MILLIGRAM(S): 10 TABLET ORAL at 06:01

## 2024-11-16 RX ADMIN — Medication 2000 UNIT(S): at 13:15

## 2024-11-16 RX ADMIN — IPRATROPIUM BROMIDE AND ALBUTEROL SULFATE 3 MILLILITER(S): 2.5; .5 SOLUTION RESPIRATORY (INHALATION) at 18:25

## 2024-11-16 RX ADMIN — IPRATROPIUM BROMIDE AND ALBUTEROL SULFATE 3 MILLILITER(S): 2.5; .5 SOLUTION RESPIRATORY (INHALATION) at 13:15

## 2024-11-16 RX ADMIN — PIPERACILLIN SODIUM AND TAZOBACTAM SODIUM 25 GRAM(S): 4; .5 INJECTION, POWDER, LYOPHILIZED, FOR SOLUTION INTRAVENOUS at 18:25

## 2024-11-16 NOTE — PROGRESS NOTE ADULT - SUBJECTIVE AND OBJECTIVE BOX
[facility] Division of Hospital Medicine  Manda Porter MD  Available on Teams [place]    Patient is a 84y old  Female who presents with a chief complaint of Admitted following hypoxaemia following enteroscopy with chills, rigors. (16 Nov 2024 06:16)      SUBJECTIVE / OVERNIGHT EVENTS:  Patient evaluated at bedside, with no acute overnight events. Denies any f/c, does still have slight cough - note breathing does feel slightly improved. Has ambulated to/from restroom, notes initially winded on exertion, but with subsequent improvement. Weaned to 2L NC. Denies any abd pain, N/V, blood in stool.     ADDITIONAL REVIEW OF SYSTEMS: negative    MEDICATIONS  (STANDING):  albuterol/ipratropium for Nebulization 3 milliLiter(s) Nebulizer every 6 hours  allopurinol 300 milliGRAM(s) Oral daily  amLODIPine   Tablet 5 milliGRAM(s) Oral daily  cholecalciferol 2000 Unit(s) Oral daily  doxazosin 2 milliGRAM(s) Oral at bedtime  lidocaine   4% Patch 1 Patch Transdermal daily  pantoprazole    Tablet 40 milliGRAM(s) Oral before breakfast  piperacillin/tazobactam IVPB. 3.375 Gram(s) IV Intermittent once  piperacillin/tazobactam IVPB.- 3.375 Gram(s) IV Intermittent once    MEDICATIONS  (PRN):  acetaminophen     Tablet .. 650 milliGRAM(s) Oral every 6 hours PRN Temp greater or equal to 38C (100.4F), Mild Pain (1 - 3)      CAPILLARY BLOOD GLUCOSE        I&O's Summary    15 Nov 2024 07:01  -  16 Nov 2024 07:00  --------------------------------------------------------  IN: 600 mL / OUT: 400 mL / NET: 200 mL        PHYSICAL EXAM:  Vital Signs Last 24 Hrs  T(C): 36.7 (16 Nov 2024 09:30), Max: 36.8 (15 Nov 2024 16:03)  T(F): 98.1 (16 Nov 2024 09:30), Max: 98.3 (16 Nov 2024 04:54)  HR: 82 (16 Nov 2024 09:30) (65 - 82)  BP: 116/62 (16 Nov 2024 09:30) (111/67 - 117/69)  BP(mean): --  RR: 18 (16 Nov 2024 09:30) (18 - 18)  SpO2: 97% (16 Nov 2024 09:30) (93% - 97%)    Parameters below as of 16 Nov 2024 09:30  Patient On (Oxygen Delivery Method): nasal cannula  O2 Flow (L/min): 2    ONSTITUTIONAL: Well-groomed, in no apparent distress  EYES: No conjunctival or scleral injection, non-icteric; PERRLA and symmetric  ENMT: Oral mucosa with moist membranes  RESPIRATORY: Breathing comfortably; lungs CTA without wheeze/rhonchi/rales  CARDIOVASCULAR: +S1S2, RRR, no M/G/R; pedal pulses full and symmetric; no lower extremity edema  GASTROINTESTINAL: No palpable masses or tenderness, +BS throughout, no rebound/guarding  MUSCULOSKELETAL: no digital clubbing or cyanosis; no bony spinal tenderness, no reproducible paraspinal tenderness   SKIN: No rashes or ulcers noted  NEUROLOGIC: CN II-XII intact; sensation intact in LEs b/l to light touch  PSYCHIATRIC: A+O x 3; mood and affect appropriate    LABS:                        7.1    7.88  )-----------( 187      ( 16 Nov 2024 07:18 )             22.0     11-16    134[L]  |  101  |  31[H]  ----------------------------<  98  4.4   |  21[L]  |  1.26    Ca    8.6      16 Nov 2024 07:18  Phos  4.5     11-15  Mg     2.2     11-15    TPro  5.7[L]  /  Alb  3.4  /  TBili  1.1  /  DBili  x   /  AST  6[L]  /  ALT  6[L]  /  AlkPhos  49  11-16          Urinalysis Basic - ( 16 Nov 2024 07:18 )    Color: x / Appearance: x / SG: x / pH: x  Gluc: 98 mg/dL / Ketone: x  / Bili: x / Urobili: x   Blood: x / Protein: x / Nitrite: x   Leuk Esterase: x / RBC: x / WBC x   Sq Epi: x / Non Sq Epi: x / Bacteria: x        Culture - Blood (collected 15 Nov 2024 07:14)  Source: .Blood BLOOD  Preliminary Report (16 Nov 2024 10:01):    No growth at 24 hours    Culture - Blood (collected 14 Nov 2024 20:13)  Source: .Blood BLOOD  Preliminary Report (16 Nov 2024 01:02):    No growth at 24 hours        RADIOLOGY & ADDITIONAL TESTS:  Results Reviewed:   Imaging Personally Reviewed:  Electrocardiogram Personally Reviewed:    COORDINATION OF CARE:  Care Discussed with Consultants/Other Providers [Y/N]:  Prior or Outpatient Records Reviewed [Y/N]:

## 2024-11-16 NOTE — PROGRESS NOTE ADULT - ASSESSMENT
83yo F hx of MDS, AS s/p TAVR, c/b ,multiple  small bowel AVMs presented for outpatient enteroscopy for management of AVMs in setting of episode of melena and anemia 1 month ago. Enteroscopy 11/14 with APC of multiple small bowel AVMs, tolerated procedure well. In PACU during recovery patient was noted to have O2 saturation ranging from 85-87% after episodes of emesis, initial interventions included O2NC to 2L and, incentive spirometry and nebulizer treatments, O2 sat with minimal improvement  after interventions and required NC to increase to 4L. Patient admitted for pneumonia post procedure     #Pneumonia- likely aspiration   Xray with patchy left mid and lower lung airspace opacities compatible with pneumonia.    #Melena/anemia   - APC of multiple small bowel AVMs    Recommendations:   - Consider broadening to Zosyn for aspiration PNA  - Wean O2 as tolerated   - Trend CBC and monitor for melena.   - Pulmonary hygiene therapy per primary team    Seen with Dr. Brandon Trinh  GI/Hepatology Fellow    MONDAY-FRIDAY 8AM-5PM:  Pager# [phone] ([facility]) or [phone] ([facility])    NON-URGENT CONSULTS:  Please email [email or web address] OR [email or web address]  AT NIGHT AND ON WEEKENDS:  Contact on-call GI fellow via AMION by paging or hospital  from 5pm-8am and on weekends/holidays       85yo F hx of MDS, AS s/p TAVR, c/b ,multiple  small bowel AVMs presented for outpatient enteroscopy for management of AVMs in setting of episode of melena and anemia 1 month ago. Enteroscopy 11/14 with APC of multiple small bowel AVMs, tolerated procedure well. In PACU during recovery patient was noted to have O2 saturation ranging from 85-87% after episodes of emesis, initial interventions included O2NC to 2L and, incentive spirometry and nebulizer treatments, O2 sat with minimal improvement  after interventions and required NC to increase to 4L. Patient admitted for pneumonia post procedure     #Pneumonia- likely aspiration   Xray with patchy left mid and lower lung airspace opacities compatible with pneumonia.    #Melena/anemia   - APC of multiple small bowel AVMs    Recommendations:   - Consider broadening to Zosyn for aspiration PNA  - Wean O2 as tolerated   - Trend CBC and monitor for melena.   - Check iron studies, TIBC, ferritin; might need IV iron given AVMs  - Pulmonary hygiene therapy per primary team    Seen with Dr. Brandon Trinh  GI/Hepatology Fellow    MONDAY-FRIDAY 8AM-5PM:  Pager# [phone] ([facility]) or [phone] ([facility])    NON-URGENT CONSULTS:  Please email [email or web address] OR [email or web address]  AT NIGHT AND ON WEEKENDS:  Contact on-call GI fellow via AMION by paging or hospital  from 5pm-8am and on weekends/holidays

## 2024-11-16 NOTE — PROGRESS NOTE ADULT - SUBJECTIVE AND OBJECTIVE BOX
Interval Events:   No acute events overnight.    Patient denied fever, chills, nausea, vomiting, abdominal pain, diarrhea, melena, hematochezia or hematemesis. Tolerating PO. Last BM was yesterday.     ROS:   12 point review of systems performed and negative except otherwise noted in HPI.    Hospital Medications:  acetaminophen     Tablet .. 650 milliGRAM(s) Oral every 6 hours PRN  albuterol/ipratropium for Nebulization 3 milliLiter(s) Nebulizer every 6 hours  allopurinol 300 milliGRAM(s) Oral daily  amLODIPine   Tablet 5 milliGRAM(s) Oral daily  azithromycin  IVPB      azithromycin  IVPB 500 milliGRAM(s) IV Intermittent every 24 hours  cefTRIAXone   IVPB 1000 milliGRAM(s) IV Intermittent every 24 hours  cefTRIAXone   IVPB      cholecalciferol 2000 Unit(s) Oral daily  doxazosin 2 milliGRAM(s) Oral at bedtime  lidocaine   4% Patch 1 Patch Transdermal daily  pantoprazole    Tablet 40 milliGRAM(s) Oral before breakfast      PHYSICAL EXAM:   Vital Signs:  Vital Signs Last 24 Hrs  T(C): 36.8 (16 Nov 2024 04:54), Max: 36.8 (15 Nov 2024 16:03)  T(F): 98.3 (16 Nov 2024 04:54), Max: 98.3 (16 Nov 2024 04:54)  HR: 65 (16 Nov 2024 04:54) (65 - 82)  BP: 117/69 (16 Nov 2024 04:54) (107/60 - 117/69)  BP(mean): --  RR: 18 (16 Nov 2024 04:54) (18 - 18)  SpO2: 97% (16 Nov 2024 04:54) (93% - 97%)    Parameters below as of 16 Nov 2024 04:54  Patient On (Oxygen Delivery Method): nasal cannula  O2 Flow (L/min): 4    Daily     Daily     GENERAL: no acute distress  NEURO: alert and oriented x 3  HEENT: NCAT, no conjunctival pallor appreciated; anicteric sclera  CHEST: no respiratory distress, no accessory muscle use  CARDIAC: regular rate, +S1/S2  ABDOMEN: soft, nontender, no rebound or guarding  EXTREMITIES: warm, well perfused  SKIN: no active lesions noted    LABS: reviewed                        7.5    9.89  )-----------( 242      ( 15 Nov 2024 07:15 )             24.5     11-15    133[L]  |  98  |  23  ----------------------------<  98  4.3   |  19[L]  |  1.26    Ca    8.5      15 Nov 2024 07:14  Phos  4.5     11-15  Mg     2.2     11-15    TPro  5.6[L]  /  Alb  3.5  /  TBili  1.6[H]  /  DBili  x   /  AST  9[L]  /  ALT  <5[L]  /  AlkPhos  48  11-15       Interval Events:   No acute events overnight.    Patient still requires 4L O2. Reports to have slight cough, but no SOB on O2 via NC. No abdominal pain or GIB.    Hospital Medications:  acetaminophen     Tablet .. 650 milliGRAM(s) Oral every 6 hours PRN  albuterol/ipratropium for Nebulization 3 milliLiter(s) Nebulizer every 6 hours  allopurinol 300 milliGRAM(s) Oral daily  amLODIPine   Tablet 5 milliGRAM(s) Oral daily  azithromycin  IVPB      azithromycin  IVPB 500 milliGRAM(s) IV Intermittent every 24 hours  cefTRIAXone   IVPB 1000 milliGRAM(s) IV Intermittent every 24 hours  cefTRIAXone   IVPB      cholecalciferol 2000 Unit(s) Oral daily  doxazosin 2 milliGRAM(s) Oral at bedtime  lidocaine   4% Patch 1 Patch Transdermal daily  pantoprazole    Tablet 40 milliGRAM(s) Oral before breakfast      PHYSICAL EXAM:   Vital Signs:  Vital Signs Last 24 Hrs  T(C): 36.8 (16 Nov 2024 04:54), Max: 36.8 (15 Nov 2024 16:03)  T(F): 98.3 (16 Nov 2024 04:54), Max: 98.3 (16 Nov 2024 04:54)  HR: 65 (16 Nov 2024 04:54) (65 - 82)  BP: 117/69 (16 Nov 2024 04:54) (107/60 - 117/69)  BP(mean): --  RR: 18 (16 Nov 2024 04:54) (18 - 18)  SpO2: 97% (16 Nov 2024 04:54) (93% - 97%)    Parameters below as of 16 Nov 2024 04:54  Patient On (Oxygen Delivery Method): nasal cannula  O2 Flow (L/min): 4    Daily     Daily     GENERAL: no acute distress  NEURO: alert and oriented x 3  HEENT: NCAT, anicteric sclera  CHEST: no respiratory distress on O2 via NC, no accessory muscle use  CARDIAC: regular rate, +S1/S2  ABDOMEN: soft, nontender, no rebound or guarding  EXTREMITIES: warm, well perfused  SKIN: no active lesions noted    LABS: reviewed                        7.5    9.89  )-----------( 242      ( 15 Nov 2024 07:15 )             24.5     11-15    133[L]  |  98  |  23  ----------------------------<  98  4.3   |  19[L]  |  1.26    Ca    8.5      15 Nov 2024 07:14  Phos  4.5     11-15  Mg     2.2     11-15    TPro  5.6[L]  /  Alb  3.5  /  TBili  1.6[H]  /  DBili  x   /  AST  9[L]  /  ALT  <5[L]  /  AlkPhos  48  11-15

## 2024-11-16 NOTE — PROGRESS NOTE ADULT - PROBLEM SELECTOR PLAN 1
- With hypoxia in PACU s/p enteroscopy  - CXR with LLL PNA, possible aspiration in setting of emesis post procedure, CT chest with multifocal PNA  - COVID/flu/RSV negative, MRSA nares negative, Strep ag negative, urine legionella pending  - C/w abx (zosyn) for now  - Supplemental O2 as needed, c/w duonebs for now  - Incentive spirometry

## 2024-11-17 LAB
BLD GP AB SCN SERPL QL: NEGATIVE — SIGNIFICANT CHANGE UP
FERRITIN SERPL-MCNC: 595 NG/ML — HIGH (ref 13–330)
HCT VFR BLD CALC: 22.1 % — LOW (ref 34.5–45)
HGB BLD-MCNC: 7 G/DL — CRITICAL LOW (ref 11.5–15.5)
IRON SATN MFR SERPL: 22 % — SIGNIFICANT CHANGE UP (ref 14–50)
IRON SATN MFR SERPL: 41 UG/DL — SIGNIFICANT CHANGE UP (ref 30–160)
MCHC RBC-ENTMCNC: 31.1 PG — SIGNIFICANT CHANGE UP (ref 27–34)
MCHC RBC-ENTMCNC: 31.7 G/DL — LOW (ref 32–36)
MCV RBC AUTO: 98.2 FL — SIGNIFICANT CHANGE UP (ref 80–100)
NRBC # BLD: 0 /100 WBCS — SIGNIFICANT CHANGE UP (ref 0–0)
PLATELET # BLD AUTO: 195 K/UL — SIGNIFICANT CHANGE UP (ref 150–400)
RBC # BLD: 2.25 M/UL — LOW (ref 3.8–5.2)
RBC # FLD: 25.2 % — HIGH (ref 10.3–14.5)
RH IG SCN BLD-IMP: POSITIVE — SIGNIFICANT CHANGE UP
TIBC SERPL-MCNC: 187 UG/DL — LOW (ref 220–430)
UIBC SERPL-MCNC: 146 UG/DL — SIGNIFICANT CHANGE UP (ref 110–370)
WBC # BLD: 5.89 K/UL — SIGNIFICANT CHANGE UP (ref 3.8–10.5)
WBC # FLD AUTO: 5.89 K/UL — SIGNIFICANT CHANGE UP (ref 3.8–10.5)

## 2024-11-17 PROCEDURE — 99232 SBSQ HOSP IP/OBS MODERATE 35: CPT

## 2024-11-17 RX ADMIN — ALLOPURINOL 300 MILLIGRAM(S): 300 TABLET ORAL at 12:17

## 2024-11-17 RX ADMIN — PIPERACILLIN SODIUM AND TAZOBACTAM SODIUM 25 GRAM(S): 4; .5 INJECTION, POWDER, LYOPHILIZED, FOR SOLUTION INTRAVENOUS at 21:43

## 2024-11-17 RX ADMIN — PANTOPRAZOLE SODIUM 40 MILLIGRAM(S): 40 TABLET, DELAYED RELEASE ORAL at 06:26

## 2024-11-17 RX ADMIN — ACETAMINOPHEN 500MG 650 MILLIGRAM(S): 500 TABLET, COATED ORAL at 16:33

## 2024-11-17 RX ADMIN — IPRATROPIUM BROMIDE AND ALBUTEROL SULFATE 3 MILLILITER(S): 2.5; .5 SOLUTION RESPIRATORY (INHALATION) at 21:51

## 2024-11-17 RX ADMIN — IPRATROPIUM BROMIDE AND ALBUTEROL SULFATE 3 MILLILITER(S): 2.5; .5 SOLUTION RESPIRATORY (INHALATION) at 06:26

## 2024-11-17 RX ADMIN — PIPERACILLIN SODIUM AND TAZOBACTAM SODIUM 25 GRAM(S): 4; .5 INJECTION, POWDER, LYOPHILIZED, FOR SOLUTION INTRAVENOUS at 02:00

## 2024-11-17 RX ADMIN — Medication 2000 UNIT(S): at 12:16

## 2024-11-17 RX ADMIN — PIPERACILLIN SODIUM AND TAZOBACTAM SODIUM 25 GRAM(S): 4; .5 INJECTION, POWDER, LYOPHILIZED, FOR SOLUTION INTRAVENOUS at 06:26

## 2024-11-17 RX ADMIN — IPRATROPIUM BROMIDE AND ALBUTEROL SULFATE 3 MILLILITER(S): 2.5; .5 SOLUTION RESPIRATORY (INHALATION) at 12:17

## 2024-11-17 RX ADMIN — Medication 2 MILLIGRAM(S): at 21:44

## 2024-11-17 RX ADMIN — IPRATROPIUM BROMIDE AND ALBUTEROL SULFATE 3 MILLILITER(S): 2.5; .5 SOLUTION RESPIRATORY (INHALATION) at 17:38

## 2024-11-17 RX ADMIN — LIDOCAINE 1 PATCH: 40 CREAM TOPICAL at 05:01

## 2024-11-17 RX ADMIN — PIPERACILLIN SODIUM AND TAZOBACTAM SODIUM 25 GRAM(S): 4; .5 INJECTION, POWDER, LYOPHILIZED, FOR SOLUTION INTRAVENOUS at 13:44

## 2024-11-17 RX ADMIN — AMLODIPINE BESYLATE 5 MILLIGRAM(S): 10 TABLET ORAL at 06:26

## 2024-11-17 NOTE — PROGRESS NOTE ADULT - ASSESSMENT
Impression:  1. Anemia - likely multifactorial with known AVMs driving small bowel chronic blood loss  2. Pneumonia after enteroscopy    Plan:  -Continue antibiotics  -F/u iron studies; consider iron supplementation inpatient if iron deficient  -Would consider PRBC x 1 unit given Hgb ~7  -Would finish antibiotics  -Would make sure O2 sat is ok after ambulation prior to discharge  -Needs close follow-up as outpatient to continue Aranesp/iron if needed      Anthony (Jonny Ordoñez) MD PERRY [facility] e-mail: [email or web address]
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
## 2024-11-17 NOTE — PROGRESS NOTE ADULT - SUBJECTIVE AND OBJECTIVE BOX
[facility] Division of Hospital Medicine  Manda Porter MD  Available on Teams Luiz    Patient is a 84y old  Female who presents with a chief complaint of Admitted following hypoxaemia following enteroscopy with chills, rigors. (16 Nov 2024 13:33)      SUBJECTIVE / OVERNIGHT EVENTS:  Patient evaluated at bedside. On RA, per nursing desaturations to mid-80s on ambulation yesterday, will reeval today. Pt notes breathing feels improved, denies any CP, abd pain; does note frequent BM w/abx - states she had black BM this AM, denies BRBPR.     ADDITIONAL REVIEW OF SYSTEMS: negative    MEDICATIONS  (STANDING):  albuterol/ipratropium for Nebulization 3 milliLiter(s) Nebulizer every 6 hours  allopurinol 300 milliGRAM(s) Oral daily  amLODIPine   Tablet 5 milliGRAM(s) Oral daily  cholecalciferol 2000 Unit(s) Oral daily  doxazosin 2 milliGRAM(s) Oral at bedtime  lidocaine   4% Patch 1 Patch Transdermal daily  pantoprazole    Tablet 40 milliGRAM(s) Oral before breakfast  piperacillin/tazobactam IVPB.. 3.375 Gram(s) IV Intermittent every 8 hours    MEDICATIONS  (PRN):  acetaminophen     Tablet .. 650 milliGRAM(s) Oral every 6 hours PRN Temp greater or equal to 38C (100.4F), Mild Pain (1 - 3)      CAPILLARY BLOOD GLUCOSE        I&O's Summary    16 Nov 2024 07:01  -  17 Nov 2024 07:00  --------------------------------------------------------  IN: 480 mL / OUT: 0 mL / NET: 480 mL    17 Nov 2024 07:01  -  17 Nov 2024 14:35  --------------------------------------------------------  IN: 450 mL / OUT: 0 mL / NET: 450 mL        PHYSICAL EXAM:  Vital Signs Last 24 Hrs  T(C): 36.4 (17 Nov 2024 12:43), Max: 36.7 (16 Nov 2024 21:02)  T(F): 97.5 (17 Nov 2024 12:43), Max: 98.1 (16 Nov 2024 21:02)  HR: 85 (17 Nov 2024 12:43) (80 - 85)  BP: 118/66 (17 Nov 2024 12:43) (118/66 - 126/74)  BP(mean): --  RR: 18 (17 Nov 2024 12:43) (18 - 18)  SpO2: 94% (17 Nov 2024 12:43) (91% - 94%)    Parameters below as of 17 Nov 2024 12:43  Patient On (Oxygen Delivery Method): room air    CONSTITUTIONAL: Well-groomed, in no apparent distress  EYES: No conjunctival or scleral injection, non-icteric; PERRLA and symmetric  ENMT: Oral mucosa with moist membranes  RESPIRATORY: Breathing comfortably; lungs CTA without wheeze/rhonchi/rales  CARDIOVASCULAR: +S1S2, RRR, no M/G/R; pedal pulses full and symmetric; no lower extremity edema  GASTROINTESTINAL: No palpable masses or tenderness, +BS throughout, no rebound/guarding  MUSCULOSKELETAL: no digital clubbing or cyanosis; no bony spinal tenderness, no reproducible paraspinal tenderness   SKIN: No rashes or ulcers noted  NEUROLOGIC: CN II-XII intact; sensation intact in LEs b/l to light touch  PSYCHIATRIC: A+O x 3; mood and affect appropriate      LABS:                        7.0    5.89  )-----------( 195      ( 17 Nov 2024 07:16 )             22.1     11-16    134[L]  |  101  |  31[H]  ----------------------------<  98  4.4   |  21[L]  |  1.26    Ca    8.6      16 Nov 2024 07:18    TPro  5.7[L]  /  Alb  3.4  /  TBili  1.1  /  DBili  x   /  AST  6[L]  /  ALT  6[L]  /  AlkPhos  49  11-16          Urinalysis Basic - ( 16 Nov 2024 07:18 )    Color: x / Appearance: x / SG: x / pH: x  Gluc: 98 mg/dL / Ketone: x  / Bili: x / Urobili: x   Blood: x / Protein: x / Nitrite: x   Leuk Esterase: x / RBC: x / WBC x   Sq Epi: x / Non Sq Epi: x / Bacteria: x        Culture - Blood (collected 15 Nov 2024 07:14)  Source: .Blood BLOOD  Preliminary Report (17 Nov 2024 10:01):    No growth at 48 Hours    Culture - Blood (collected 14 Nov 2024 20:13)  Source: .Blood BLOOD  Preliminary Report (17 Nov 2024 01:02):    No growth at 48 Hours        RADIOLOGY & ADDITIONAL TESTS:  Results Reviewed:   Imaging Personally Reviewed:  Electrocardiogram Personally Reviewed:    COORDINATION OF CARE:  Care Discussed with Consultants/Other Providers [Y/N]:  Prior or Outpatient Records Reviewed [Y/N]:

## 2024-11-17 NOTE — PROVIDER CONTACT NOTE (MEDICATION) - ACTION/TREATMENT ORDERED:
Explain to patient importance of medication. Pt verbalized understanding and insisted to refuse.   ACP made aware

## 2024-11-17 NOTE — PROGRESS NOTE ADULT - PROBLEM SELECTOR PLAN 1
- With hypoxia in PACU s/p enteroscopy  - CXR with LLL PNA, possible aspiration in setting of emesis post procedure, CT chest with multifocal PNA  - COVID/flu/RSV negative, MRSA nares negative, Strep ag negative, urine legionella pending  - C/w abx (zosyn) for now  - Supplemental O2 as needed, c/w duonebs for now - weaned to RA  - Incentive spirometry

## 2024-11-18 ENCOUNTER — TRANSCRIPTION ENCOUNTER (OUTPATIENT)
Age: 84
End: 2024-11-18

## 2024-11-18 VITALS
HEART RATE: 81 BPM | TEMPERATURE: 98 F | OXYGEN SATURATION: 97 % | SYSTOLIC BLOOD PRESSURE: 134 MMHG | RESPIRATION RATE: 18 BRPM | DIASTOLIC BLOOD PRESSURE: 73 MMHG

## 2024-11-18 LAB
HCT VFR BLD CALC: 28.3 % — LOW (ref 34.5–45)
HGB BLD-MCNC: 9 G/DL — LOW (ref 11.5–15.5)
MCHC RBC-ENTMCNC: 30.4 PG — SIGNIFICANT CHANGE UP (ref 27–34)
MCHC RBC-ENTMCNC: 31.8 G/DL — LOW (ref 32–36)
MCV RBC AUTO: 95.6 FL — SIGNIFICANT CHANGE UP (ref 80–100)
NRBC # BLD: 0 /100 WBCS — SIGNIFICANT CHANGE UP (ref 0–0)
PLATELET # BLD AUTO: 222 K/UL — SIGNIFICANT CHANGE UP (ref 150–400)
RBC # BLD: 2.96 M/UL — LOW (ref 3.8–5.2)
RBC # FLD: 24.4 % — HIGH (ref 10.3–14.5)
WBC # BLD: 4.51 K/UL — SIGNIFICANT CHANGE UP (ref 3.8–10.5)
WBC # FLD AUTO: 4.51 K/UL — SIGNIFICANT CHANGE UP (ref 3.8–10.5)

## 2024-11-18 PROCEDURE — 36430 TRANSFUSION BLD/BLD COMPNT: CPT

## 2024-11-18 PROCEDURE — 86900 BLOOD TYPING SEROLOGIC ABO: CPT

## 2024-11-18 PROCEDURE — 87640 STAPH A DNA AMP PROBE: CPT

## 2024-11-18 PROCEDURE — 87899 AGENT NOS ASSAY W/OPTIC: CPT

## 2024-11-18 PROCEDURE — 99233 SBSQ HOSP IP/OBS HIGH 50: CPT

## 2024-11-18 PROCEDURE — P9016: CPT

## 2024-11-18 PROCEDURE — 93306 TTE W/DOPPLER COMPLETE: CPT

## 2024-11-18 PROCEDURE — 80053 COMPREHEN METABOLIC PANEL: CPT

## 2024-11-18 PROCEDURE — 87040 BLOOD CULTURE FOR BACTERIA: CPT

## 2024-11-18 PROCEDURE — 83880 ASSAY OF NATRIURETIC PEPTIDE: CPT

## 2024-11-18 PROCEDURE — 87637 SARSCOV2&INF A&B&RSV AMP PRB: CPT

## 2024-11-18 PROCEDURE — 86850 RBC ANTIBODY SCREEN: CPT

## 2024-11-18 PROCEDURE — 83540 ASSAY OF IRON: CPT

## 2024-11-18 PROCEDURE — 87449 NOS EACH ORGANISM AG IA: CPT

## 2024-11-18 PROCEDURE — 86901 BLOOD TYPING SEROLOGIC RH(D): CPT

## 2024-11-18 PROCEDURE — 71250 CT THORAX DX C-: CPT | Mod: MC

## 2024-11-18 PROCEDURE — 86923 COMPATIBILITY TEST ELECTRIC: CPT

## 2024-11-18 PROCEDURE — 85027 COMPLETE CBC AUTOMATED: CPT

## 2024-11-18 PROCEDURE — 87641 MR-STAPH DNA AMP PROBE: CPT

## 2024-11-18 PROCEDURE — 81001 URINALYSIS AUTO W/SCOPE: CPT

## 2024-11-18 PROCEDURE — 84145 PROCALCITONIN (PCT): CPT

## 2024-11-18 PROCEDURE — 84484 ASSAY OF TROPONIN QUANT: CPT

## 2024-11-18 PROCEDURE — 36415 COLL VENOUS BLD VENIPUNCTURE: CPT

## 2024-11-18 PROCEDURE — 82728 ASSAY OF FERRITIN: CPT

## 2024-11-18 PROCEDURE — 83550 IRON BINDING TEST: CPT

## 2024-11-18 PROCEDURE — 94640 AIRWAY INHALATION TREATMENT: CPT

## 2024-11-18 PROCEDURE — 71045 X-RAY EXAM CHEST 1 VIEW: CPT

## 2024-11-18 PROCEDURE — 85025 COMPLETE CBC W/AUTO DIFF WBC: CPT

## 2024-11-18 PROCEDURE — 84100 ASSAY OF PHOSPHORUS: CPT

## 2024-11-18 PROCEDURE — 83735 ASSAY OF MAGNESIUM: CPT

## 2024-11-18 PROCEDURE — 99233 SBSQ HOSP IP/OBS HIGH 50: CPT | Mod: GC

## 2024-11-18 RX ORDER — AMOXICILLIN/POTASSIUM CLAV 250-125 MG
875 TABLET ORAL
Qty: 6 | Refills: 0
Start: 2024-11-18 | End: 2024-11-20

## 2024-11-18 RX ORDER — SODIUM CHLORIDE 9 MG/ML
3 INJECTION, SOLUTION INTRAMUSCULAR; INTRAVENOUS; SUBCUTANEOUS
Qty: 1 | Refills: 0
Start: 2024-11-18 | End: 2024-11-22

## 2024-11-18 RX ADMIN — PIPERACILLIN SODIUM AND TAZOBACTAM SODIUM 25 GRAM(S): 4; .5 INJECTION, POWDER, LYOPHILIZED, FOR SOLUTION INTRAVENOUS at 05:27

## 2024-11-18 RX ADMIN — AMLODIPINE BESYLATE 5 MILLIGRAM(S): 10 TABLET ORAL at 05:26

## 2024-11-18 RX ADMIN — IPRATROPIUM BROMIDE AND ALBUTEROL SULFATE 3 MILLILITER(S): 2.5; .5 SOLUTION RESPIRATORY (INHALATION) at 11:58

## 2024-11-18 RX ADMIN — Medication 2000 UNIT(S): at 11:57

## 2024-11-18 RX ADMIN — PANTOPRAZOLE SODIUM 40 MILLIGRAM(S): 40 TABLET, DELAYED RELEASE ORAL at 05:27

## 2024-11-18 RX ADMIN — ALLOPURINOL 300 MILLIGRAM(S): 300 TABLET ORAL at 11:57

## 2024-11-18 RX ADMIN — IPRATROPIUM BROMIDE AND ALBUTEROL SULFATE 3 MILLILITER(S): 2.5; .5 SOLUTION RESPIRATORY (INHALATION) at 05:27

## 2024-11-18 NOTE — PROGRESS NOTE ADULT - PROBLEM SELECTOR PLAN 7
- Noted 2 cm thyroid nodule on CT scan  - Patient informed, states already aware, sees outpt provider for serial US   - Continue outpt surveillance
- Noted 2 cm thyroid nodule on CT scan  - Patient informed, states already aware, sees outpt provider for serial US   - Continue outpt surveillance
- DVT ppx: SCDs given recent GI procedure   - Diet: regular diet  - Full Code  - Discussed with ACP, pt son at bedside
- Noted 2 cm thyroid nodule on CT scan  - Patient informed, states already aware, sees outpt provider for serial US   - Continue outpt surveillance

## 2024-11-18 NOTE — DISCHARGE NOTE PROVIDER - NSDCMRMEDTOKEN_GEN_ALL_CORE_FT
allopurinol 300 mg oral tablet: 1 tab(s) orally once a day  Aranesp 500 mcg/mL injectable solution: 500 microgram(s) subcutaneously every 2 weeks  doxazosin 2 mg oral tablet: 1 tab(s) orally once a day  Norvasc 5 mg oral tablet: 1 tab(s) orally once a day  Protonix 40 mg oral delayed release tablet: 1 tab(s) orally once a day  Tylenol 500 mg oral tablet: 2 tab(s) orally every 6 hours as needed for  mild pain  Vitamin D3 125 mcg (5000 intl units) oral tablet: 1 tab(s) orally once a day   allopurinol 300 mg oral tablet: 1 tab(s) orally once a day  Aranesp 500 mcg/mL injectable solution: 500 microgram(s) subcutaneously every 2 weeks  cefuroxime 500 mg oral tablet: 1 tab(s) orally 2 times a day  doxazosin 2 mg oral tablet: 1 tab(s) orally once a day  Norvasc 5 mg oral tablet: 1 tab(s) orally once a day  Protonix 40 mg oral delayed release tablet: 1 tab(s) orally once a day  Tylenol 500 mg oral tablet: 2 tab(s) orally every 6 hours as needed for  mild pain  Vitamin D3 125 mcg (5000 intl units) oral tablet: 1 tab(s) orally once a day   allopurinol 300 mg oral tablet: 1 tab(s) orally once a day  Aranesp 500 mcg/mL injectable solution: 500 microgram(s) subcutaneously every 2 weeks  cefuroxime 500 mg oral tablet: 1 tab(s) orally 2 times a day  doxazosin 2 mg oral tablet: 1 tab(s) orally once a day  Norvasc 5 mg oral tablet: 1 tab(s) orally once a day  Protonix 40 mg oral delayed release tablet: 1 tab(s) orally once a day  sodium chloride 0.9% inhalation solution: 3 milliliter(s) inhaled every 6 hours as needed for  cough or shortness of breath  Tylenol 500 mg oral tablet: 2 tab(s) orally every 6 hours as needed for  mild pain  Vitamin D3 125 mcg (5000 intl units) oral tablet: 1 tab(s) orally once a day   allopurinol 300 mg oral tablet: 1 tab(s) orally once a day  amoxicillin-clavulanate 875 mg-125 mg oral tablet: 875 milligram(s) orally 2 times a day  Aranesp 500 mcg/mL injectable solution: 500 microgram(s) subcutaneously every 2 weeks  doxazosin 2 mg oral tablet: 1 tab(s) orally once a day  Norvasc 5 mg oral tablet: 1 tab(s) orally once a day  Protonix 40 mg oral delayed release tablet: 1 tab(s) orally once a day  sodium chloride 0.9% inhalation solution: 3 milliliter(s) inhaled every 6 hours as needed for  cough or shortness of breath  Tylenol 500 mg oral tablet: 2 tab(s) orally every 6 hours as needed for  mild pain  Vitamin D3 125 mcg (5000 intl units) oral tablet: 1 tab(s) orally once a day

## 2024-11-18 NOTE — PROGRESS NOTE ADULT - PROBLEM SELECTOR PLAN 9
- DVT ppx: SCDs given recent GI procedure
- DVT ppx: SCDs given recent GI procedure   - Diet: regular (renal) diet  - Full Code  - Discussed with ACP
- DVT ppx: SCDs given recent GI procedure   - Diet: regular (renal) diet  - Full Code  - Discussed with ACP, son updated at bedside 11/15

## 2024-11-18 NOTE — PROGRESS NOTE ADULT - PROBLEM SELECTOR PLAN 3
- Hx of multiple MDS, small bowel AVMs, s/p enteroscopy with GI 11/14 in setting of 1 month hx of anemia, melena  - GI continuing to follow, monitor H/H   - Hb 7.0 this AM; pt agreeable to transfusion inpt, with repeat CBC
- Hx of multiple small bowel AVMs, s/p enteroscopy with GI 11/14 in setting of 1 month hx of anemia, melena  - GI continuing to follow, monitor H/H (Hb 7.1 this AM)  - Will check iron studies
- Hx of multiple small bowel AVMs, s/p enteroscopy with GI 11/14 in setting of 1 month hx of anemia, melena  - GI continuing to follow, monitor H/H
- Hx of multiple MDS, small bowel AVMs, s/p enteroscopy with GI 11/14 in setting of 1 month hx of anemia, melena  - hb stable, s/p 1 transfusion 11/17

## 2024-11-18 NOTE — DISCHARGE NOTE NURSING/CASE MANAGEMENT/SOCIAL WORK - FINANCIAL ASSISTANCE
[facility] provides services at a reduced cost to those who are determined to be eligible through [facility]’s financial assistance program. Information regarding [facility]’s financial assistance program can be found by going to [email or web address] or by calling [phone].

## 2024-11-18 NOTE — PROGRESS NOTE ADULT - PROBLEM SELECTOR PROBLEM 4
History of transcatheter aortic valve replacement (TAVR)

## 2024-11-18 NOTE — PROGRESS NOTE ADULT - PROBLEM SELECTOR PLAN 8
- Noted 2.5 cm left adrenal nodule on CT imaging; pt also aware of finding and has been following up with PCP/nephro for period surveillance imaging  - Continued outpt management

## 2024-11-18 NOTE — DISCHARGE NOTE PROVIDER - CARE PROVIDER_API CALL
Dianelys Harris  [facility] Medicine  [address] [place] [address]  Phone: [phone]  Fax: [phone]  Follow Up Time: 1 week    Darshan Lan  Gastroenterology  [address] [place] [id number]  Phone: [phone]  Fax: [phone]  Follow Up Time: 1 week

## 2024-11-18 NOTE — PROGRESS NOTE ADULT - PROBLEM SELECTOR PLAN 5
- RCC s/p R nephrectomy in 2021, follows with Dr. Ankit Reed at [place]   - Continue to monitor Cr, avoid nephrotoxic agents
- RCC s/p R nephrectomy in 2021, follows with Dr. Ankit Reed at [place]   - Continue to monitor Cr, avoid nephrotoxic agents
- RCC s/p R nephrectomy in 2021, follows with Dr. Ankit Reed at [place]   - Continue to monitor Cr, avoid nephrotoxic agents
- RCC s/p R nephrectomy in 2021, follows with Dr. Ankit Reed at [place]   - Continue to monitor Cr, avoid nephrotoxic agents

## 2024-11-18 NOTE — PROGRESS NOTE ADULT - ASSESSMENT
Impression:  1. Anemia - likely multifactorial with known AVMs driving small bowel chronic blood loss  - TIBC 187, % saturation 22, iron total 41  2. Pneumonia after enteroscopy    Plan:  -Continue antibiotics  -Would finish antibiotics, can be PO course   -Would make sure O2 sat is ok after ambulation prior to discharge  -Needs close follow-up as outpatient to continue Aranesp/iron if needed     All recommendations are tentative until note is attested by attending.     Briseyda Gamez, PGY-6  Gastroenterology/Hepatology Fellow  Available on [employer] Teams  [phone] ([facility] Short Range Pager)  [phone] ([facility] Long Range Pager)    On Weekends/Holidays (All day) and Weekdays after 5 PM to 8AM:  For non-urgent consults, please email [email or web address] or [email or web address]  For emergent consults, please contact on call GI team.  See Amion schedule ([facility]), [employer] paging system ([facility]), or call hospital  ([facility])

## 2024-11-18 NOTE — PROGRESS NOTE ADULT - PROBLEM SELECTOR PLAN 1
- CT chest with multifocal PNA  - COVID/flu/RSV negative, MRSA nares negative, Strep ag negative, urine legionella negative   - switch to Augmentin at discharge for aspiration pneumonia

## 2024-11-18 NOTE — PROGRESS NOTE ADULT - PROBLEM SELECTOR PROBLEM 5
Ailyn Gaming called requesting a refill of the below medication which has been pended for you:     Requested Prescriptions     Pending Prescriptions Disp Refills    naproxen sodium (ANAPROX) 550 MG tablet 60 tablet 3     Sig: Take 1 tablet by mouth 2 times daily (with meals)    gabapentin (NEURONTIN) 100 MG capsule 60 capsule 0     Sig: Take 1 capsule by mouth 2 times daily for 30 days.     atorvastatin (LIPITOR) 20 MG tablet 30 tablet 5     Sig: Take 1 tablet by mouth daily    famotidine (PEPCID) 20 MG tablet 30 tablet 11     Sig: Take 1 tablet by mouth 2 times daily    spironolactone (ALDACTONE) 50 MG tablet 30 tablet 3     Sig: Take 1 tablet by mouth daily    levothyroxine (SYNTHROID) 125 MCG tablet 30 tablet 11     Sig: TAKE 1 TABLET BY MOUTH DAILY    quinapril (ACCUPRIL) 20 MG tablet 30 tablet 5     Sig: TAKE 1 TABLET BY MOUTH EVERY NIGHT AT BEDTIME    montelukast (SINGULAIR) 10 MG tablet 30 tablet 5     Sig: Take 1 tablet by mouth nightly       Last Appointment Date: 1/7/2020  Next Appointment Date: 4/8/2020    Allergies   Allergen Reactions    Claritin-D 12 Hour [Loratadine-Pseudoephedrine Er] Other (See Comments)     \"it intensifies my migraines\"    Demerol Hcl [Meperidine] Other (See Comments)     Aggression    Percocet [Oxycodone-Acetaminophen] Rash
History of right nephrectomy

## 2024-11-18 NOTE — DISCHARGE NOTE PROVIDER - PROVIDER TOKENS
PROVIDER:[TOKEN:[[id number]],FOLLOWUP:[1 week]],PROVIDER:[TOKEN:[[id number]],FOLLOWUP:[1 week]]

## 2024-11-18 NOTE — PROGRESS NOTE ADULT - SUBJECTIVE AND OBJECTIVE BOX
Gastroenterology/Hepatology Progress Note    Interval Events:     Allergies:  shellfish (Unknown)  colchicine (Diarrhea)  adhesives (Other)  sulfa drugs (Rash)      Hospital Medications:  acetaminophen     Tablet .. 650 milliGRAM(s) Oral every 6 hours PRN  albuterol/ipratropium for Nebulization 3 milliLiter(s) Nebulizer every 6 hours  allopurinol 300 milliGRAM(s) Oral daily  amLODIPine   Tablet 5 milliGRAM(s) Oral daily  cholecalciferol 2000 Unit(s) Oral daily  doxazosin 2 milliGRAM(s) Oral at bedtime  lidocaine   4% Patch 1 Patch Transdermal daily  pantoprazole    Tablet 40 milliGRAM(s) Oral before breakfast  piperacillin/tazobactam IVPB.. 3.375 Gram(s) IV Intermittent every 8 hours      ROS: 14 point ROS negative unless otherwise state in subjective    PHYSICAL EXAM:   Vital Signs:  Vital Signs Last 24 Hrs  T(C): 36.4 (18 Nov 2024 08:03), Max: 37 (17 Nov 2024 19:44)  T(F): 97.6 (18 Nov 2024 08:03), Max: 98.6 (17 Nov 2024 19:44)  HR: 70 (18 Nov 2024 08:03) (61 - 85)  BP: 131/80 (18 Nov 2024 08:03) (118/66 - 135/81)  BP(mean): --  RR: 18 (18 Nov 2024 08:03) (18 - 18)  SpO2: 97% (18 Nov 2024 08:03) (92% - 97%)    Parameters below as of 18 Nov 2024 08:03  Patient On (Oxygen Delivery Method): room air      Daily     Daily     GENERAL:  No acute distress  HEENT:  NCAT, no scleral icterus  CHEST: no resp distress  HEART:  RRR  ABDOMEN:  Soft, non-tender, non-distended, no masses  EXTREMITIES:  No cyanosis, clubbing, or edema  SKIN:  No rash/erythema/ecchymoses/petechiae/wounds/abscess/warm/dry  NEURO:  Alert and oriented x 3, no asterixis, no tremor    LABS:                        9.0    4.51  )-----------( 222      ( 18 Nov 2024 07:02 )             28.3     Mean Cell Volume: 95.6 fl (11-18-24 @ 07:02)                      Imaging:           Gastroenterology/Hepatology Progress Note    Interval Events:   - no acute ON events  - patient reports dark brown stools, denies red or black stools   - patient reports no SOB with ambulation     Allergies:  shellfish (Unknown)  colchicine (Diarrhea)  adhesives (Other)  sulfa drugs (Rash)      Hospital Medications:  acetaminophen     Tablet .. 650 milliGRAM(s) Oral every 6 hours PRN  albuterol/ipratropium for Nebulization 3 milliLiter(s) Nebulizer every 6 hours  allopurinol 300 milliGRAM(s) Oral daily  amLODIPine   Tablet 5 milliGRAM(s) Oral daily  cholecalciferol 2000 Unit(s) Oral daily  doxazosin 2 milliGRAM(s) Oral at bedtime  lidocaine   4% Patch 1 Patch Transdermal daily  pantoprazole    Tablet 40 milliGRAM(s) Oral before breakfast  piperacillin/tazobactam IVPB.. 3.375 Gram(s) IV Intermittent every 8 hours      ROS: 14 point ROS negative unless otherwise state in subjective    PHYSICAL EXAM:   Vital Signs:  Vital Signs Last 24 Hrs  T(C): 36.4 (18 Nov 2024 08:03), Max: 37 (17 Nov 2024 19:44)  T(F): 97.6 (18 Nov 2024 08:03), Max: 98.6 (17 Nov 2024 19:44)  HR: 70 (18 Nov 2024 08:03) (61 - 85)  BP: 131/80 (18 Nov 2024 08:03) (118/66 - 135/81)  BP(mean): --  RR: 18 (18 Nov 2024 08:03) (18 - 18)  SpO2: 97% (18 Nov 2024 08:03) (92% - 97%)    Parameters below as of 18 Nov 2024 08:03  Patient On (Oxygen Delivery Method): room air      Daily     Daily     GENERAL:  No acute distress  HEENT:  NCAT, no scleral icterus  CHEST: no resp distress  HEART:  RRR  ABDOMEN:  Soft, non-tender, non-distended, no masses  EXTREMITIES:  No cyanosis, clubbing, or edema  SKIN:  No rash/erythema/ecchymoses/petechiae/wounds/abscess/warm/dry  NEURO:  Alert and oriented x 3    LABS:                        9.0    4.51  )-----------( 222      ( 18 Nov 2024 07:02 )             28.3     Mean Cell Volume: 95.6 fl (11-18-24 @ 07:02)                      Imaging:

## 2024-11-18 NOTE — PROGRESS NOTE ADULT - PROBLEM SELECTOR PLAN 6
- C/w pt's home norvasc, doxazoin

## 2024-11-18 NOTE — DISCHARGE NOTE PROVIDER - HOSPITAL COURSE
84 y.o. F w/pmhx of RCC s/p R nephrectomy 2009, MDS, PMR, gout, AS s/p TAVR 2021, reported AISHA (not on CPAP), HTN, GERD, multiple small bowel AVMs here for outpatient enteroscopy/argon plasma coagulation for management of AVM 11/14. Post-procedure with emesis, chills, hypoxia, requiring 4L NC, thus admitted to medicine.   CXR with LLL PNA, possible aspiration in setting of emesis post procedure, CT chest with multifocal PNA, COVID/flu/RSV negative, MRSA nares negative, Strep ag negative, urine legionella pending on zosyn. Patient with Hx of multiple MDS, small bowel AVMs, s/p enteroscopy with GI 11/14 in setting of 1 month hx of anemia, melena  Gi consulted.  Hb 7.0 this AM; pt agreeable to transfusion inpt, with repeat CBC in AM, If stable DCP.     HPI:  NIGHT HOSPITALIST:   Patient UNKNOWN to me previously, assigned to me at this point via Dr. Lan of GI to admit this independent 85 y/o F--patient with a history of RCC S/P past RIGHT nephrectomy in 2009 presumed to be in remission, MDS, PMR, gout, past transcatheter aortic valve replacement in 2021 at [facility] for AS, reported AISHA but not on nighttime CPAP, (office note from Dr. Lan reviewed by me from 1/29/24),   Patient with past capsule endoscopy at [facility] in June 2023 with SB with multiple bleeding sites throughout the SB, with SB enteroscopy at the time with no active bleeding source, history of essential HTN on Norvasc and doxazosin (latter at bedtime), presumed GERD on a PPI, with patient for outpatient enteroscopy for management of AVM today, with S/P argon plasma coagulation with tolerance of procedure.   Apparently in the Post Anaesthesia Care Unit with patient with emesis, with chills, rigors, and noted to have increasing requirements of supplemental oxygen until 95% on 4 L/min, with patient apparently receiving Lasix 20 mg IV x1 in the PACU and parenteral Tylenol.    Patient then transferred to [facility] with chest radiograph reviewed by me with a LLL infiltrate.  Patient notes subjective improvement in symptoms but patient is averse to another dose of IV Lasix due to patient's concerns given her solitary kidney status.   Patient denies chest pain/pressure.   NO palpitations.   No abdominal pain, no red blood per rectum or melena.   NO back pain, no tearing back pain.  NO N/V.   No HA no focal weakness. (14 Nov 2024 22:37)    Hospital Course:   Problem/Plan - 1:  ·  Problem: LLL pneumonia.   ·  Plan: - With hypoxia in PACU s/p enteroscopy  - CXR with LLL PNA, possible aspiration in setting of emesis post procedure, CT chest with multifocal PNA  - COVID/flu/RSV negative, MRSA nares negative, Strep ag negative, urine legionella pending  - C/w abx (zosyn) for now  - Supplemental O2 as needed, c/w duonebs for now - weaned to RA  - Incentive spirometry.     Problem/Plan - 2:  ·  Problem: Acute hypoxic respiratory failure.   ·  Plan: - O2 sat of 85-87% in PACU post procedure following procedure, requiring up to 4L NC  - Given duonebs, 20 IV lasix in PACU without significant improvement  - CXR with concern for LLA PNA, CT chest noncon redemonstrating multifocal PNA, no pleural effusion, or pericardial effusion seen  - With crackles on exam, BNP in 300s on admission - 668, pt declines further diuresis given concerns with solitary kidney  - TTE with EF 65%,  no rWMA, no diastolic dysfunction or significant valvular abnormalities.     Problem/Plan - 3:  ·  Problem: AVM (congenital arteriovenous malformation).   ·  Plan: - Hx of multiple MDS, small bowel AVMs, s/p enteroscopy with GI 11/14 in setting of 1 month hx of anemia, melena  - GI continuing to follow, monitor H/H   - Hb 7.0 this AM; pt agreeable to transfusion inpt, with repeat CBC.     Problem/Plan - 4:  ·  Problem: History of transcatheter aortic valve replacement (TAVR).   ·  Plan: - At [facility] in 2021  - Monitor on tele.     Problem/Plan - 5:  ·  Problem: History of right nephrectomy.   ·  Plan: - RCC s/p R nephrectomy in 2021, follows with Dr. Ankit Reed at [facility]   - Continue to monitor Cr, avoid nephrotoxic agents.     Problem/Plan - 6:  ·  Problem: Essential hypertension.   ·  Plan: - C/w pt's home norvasc, doxazoin.     Problem/Plan - 7:  ·  Problem: Thyroid nodule.   ·  Plan: - Noted 2 cm thyroid nodule on CT scan  - Patient informed, states already aware, sees outpt provider for serial US   - Continue outpt surveillance.     Problem/Plan - 8:  ·  Problem: Adrenal nodule.   ·  Plan: - Noted 2.5 cm left adrenal nodule on CT imaging; pt also aware of finding and has been following up with PCP/nephro for period surveillance imaging  - Continued outpt management.      Important Medication Changes and Reason: abx for 3 more days     Active or Pending Issues Requiring Follow-up:GI f/u     Advanced Directives:   [ x] Full code  [ ] DNR  [ ] Hospice    Discharge Diagnoses:

## 2024-11-18 NOTE — PROGRESS NOTE ADULT - NSPROGADDITIONALINFOA_GEN_ALL_CORE
disposition: DC home 11/18  discussed with acp    Ingrid Zazueta D.O.  Division of Hospital Medicine  Available on MS Teams

## 2024-11-18 NOTE — DISCHARGE NOTE NURSING/CASE MANAGEMENT/SOCIAL WORK - PATIENT PORTAL LINK FT
You can access the FollowMyHealth Patient Portal offered by [facility] by registering at the following website: [email or web address]. By joining [facility] FollowMyHealth portal, you will also be able to view your health information using other applications (apps) compatible with our system.

## 2024-11-18 NOTE — PROGRESS NOTE ADULT - SUBJECTIVE AND OBJECTIVE BOX
no complaints.    GENERAL: No fevers, no chills.  EYES: No blurry vision,  No photophobia  ENT: No sore throat.  No dysphagia  Cardiovascular: No chest pain, palpitations, orthopnea  Pulmonary: No cough, no wheezing. No shortness of breath  Gastrointestinal: No abdominal pain, no diarrhea, no constipation.    Musculoskeletal: No weakness.  No myalgias.  Dermatology:  No rashes.  Neuro: No Headache.  No vertigo.  No dizziness.  Psych: No anxiety, no depression.  Denies suicidal thoughts.    MEDICATIONS  (STANDING):  albuterol/ipratropium for Nebulization 3 milliLiter(s) Nebulizer every 6 hours  allopurinol 300 milliGRAM(s) Oral daily  amLODIPine   Tablet 5 milliGRAM(s) Oral daily  cholecalciferol 2000 Unit(s) Oral daily  doxazosin 2 milliGRAM(s) Oral at bedtime  lidocaine   4% Patch 1 Patch Transdermal daily  pantoprazole    Tablet 40 milliGRAM(s) Oral before breakfast  piperacillin/tazobactam IVPB.. 3.375 Gram(s) IV Intermittent every 8 hours    MEDICATIONS  (PRN):  acetaminophen     Tablet .. 650 milliGRAM(s) Oral every 6 hours PRN Temp greater or equal to 38C (100.4F), Mild Pain (1 - 3)    Vital Signs Last 24 Hrs  T(C): 36.4 (18 Nov 2024 12:44), Max: 37 (17 Nov 2024 19:44)  T(F): 97.6 (18 Nov 2024 12:44), Max: 98.6 (17 Nov 2024 19:44)  HR: 81 (18 Nov 2024 12:44) (61 - 85)  BP: 134/73 (18 Nov 2024 12:44) (121/67 - 135/81)  BP(mean): --  RR: 18 (18 Nov 2024 12:44) (18 - 18)  SpO2: 97% (18 Nov 2024 12:44) (92% - 97%)    Parameters below as of 18 Nov 2024 12:44  Patient On (Oxygen Delivery Method): room air    CONSTITUTIONAL: NAD  EYES: No conjunctival or scleral injection, non-icteric; PERRLA and symmetric  ENMT: Oral mucosa with moist membranes  RESPIRATORY: Breathing comfortably; lungs CTA without wheeze/rhonchi/rales  CARDIOVASCULAR: +S1S2, RRR, no M/G/R; pedal pulses full and symmetric; no lower extremity edema  GASTROINTESTINAL: No palpable masses or tenderness, +BS throughout, no rebound/guarding  MUSCULOSKELETAL: no digital clubbing or cyanosis; no bony spinal tenderness, no reproducible paraspinal tenderness   SKIN: No rashes or ulcers noted  PSYCHIATRIC: A+O x 3; mood and affect appropriate    .  LABS:                         9.0    4.51  )-----------( 222      ( 18 Nov 2024 07:02 )             28.3                     RADIOLOGY, EKG & ADDITIONAL TESTS: Reviewed.

## 2024-11-18 NOTE — PROGRESS NOTE ADULT - PROBLEM SELECTOR PLAN 4
- [facility] in 2021  - Monitor on telemetry
- At [facility] in 2021  - Monitor on tele
- At [facility] in 2021  - Monitor on tele
- At [facility] in 2021  - Monitor on tele

## 2024-11-18 NOTE — PROGRESS NOTE ADULT - REASON FOR ADMISSION
Admitted following hypoxemia following enteroscopy with chills, rigors.
Admitted following hypoxaemia following enteroscopy with chills, rigors.
Admitted following hypoxemia following enteroscopy with chills, rigors.
Admitted following hypoxaemia following enteroscopy with chills, rigors.
Admitted following hypoxaemia following enteroscopy with chills, rigors.
Admitted following hypoxemia following enteroscopy with chills, rigors.
Admitted following hypoxaemia following enteroscopy with chills, rigors.

## 2024-11-18 NOTE — PROGRESS NOTE ADULT - ATTENDING COMMENTS
Agree with above. Patient with new pneumonia on imaging, currently on 4L of nasal cannula. Feels ok today, no dyspnea other than on exertion. No melena. Would trend H/H and check iron studies, likely needs iron supplementation given AVMs. Transfuse as needed. Will follow with you closely.
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 Lan

## 2024-11-18 NOTE — PROGRESS NOTE ADULT - PROBLEM SELECTOR PROBLEM 3
AVM (congenital arteriovenous malformation)

## 2024-11-18 NOTE — DISCHARGE NOTE PROVIDER - NSDCCPCAREPLAN_GEN_ALL_CORE_FT
PRINCIPAL DISCHARGE DIAGNOSIS  Diagnosis: LLL pneumonia  Assessment and Plan of Treatment: Post procedure developed shortness of breath. Found to have pneumonia on imaging. Treated on IV antibiotics, will be transitioned to oral antibiotics for 3 more days. Please make sure to follow up with your primary care provider.      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 follow up with GI after discharge.     PRINCIPAL DISCHARGE DIAGNOSIS  Diagnosis: LLL pneumonia  Assessment and Plan of Treatment: Post procedure developed shortness of breath. Found to have pneumonia on imaging. Treated on IV antibiotics, will be transitioned to oral antibiotics for 3 more days. Please make sure to follow up with your primary care provider.      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 follow up with GI after discharge.  Also received 1 unit of blood prior to discharge, hemoglobin prior to transfusion 7.0, post transfusion 9.0

## 2024-11-18 NOTE — DISCHARGE NOTE PROVIDER - CARE PROVIDERS DIRECT ADDRESSES
[email or web address]

## 2024-11-18 NOTE — PROGRESS NOTE ADULT - PROBLEM SELECTOR PLAN 2
- O2 sat of 85-87% in PACU post procedure following procedure, requiring up to 4L NC  - Given duonebs, 20 IV lasix in PACU without significant improvement  - CXR with concern for LLA PNA, CT chest noncon redemonstrating multifocal PNA, no pleural effusion, or pericardial effusion seen  - With crackles on exam, BNP mildly elevated in 300s, pt hesitant to receive any further diuresis given hx of solitary kidney  - TTE pending
- O2 sat of 85-87% in PACU post procedure following procedure, requiring up to 4L NC  - Given duonebs, 20 IV lasix in PACU without significant improvement  - CXR with concern for LLA PNA, CT chest noncon redemonstrating multifocal PNA, no pleural effusion, or pericardial effusion seen  - With crackles on exam, BNP in 300s on admission - 668, pt declines further diuresis given concerns with solitary kidney  - TTE with EF 65%,  no rWMA, no diastolic dysfunction or significant valvular abnormalities
- O2 sat of 85-87% in PACU post procedure following procedure, requiring up to 4L NC  - Given duonebs, 20 IV lasix in PACU without significant improvement  - CXR with concern for LLA PNA, CT chest noncon redemonstrating multifocal PNA, no pleural effusion, or pericardial effusion seen  - With crackles on exam, BNP in 300s on admission - 668, pt declines further diuresis given concerns with solitary kidney  - TTE with EF 65%,  no rWMA, no diastolic dysfunction or significant valvular abnormalities
- resolved

## 2024-11-20 LAB
CULTURE RESULTS: SIGNIFICANT CHANGE UP
CULTURE RESULTS: SIGNIFICANT CHANGE UP
SPECIMEN SOURCE: SIGNIFICANT CHANGE UP
SPECIMEN SOURCE: SIGNIFICANT CHANGE UP

## 2024-11-20 PROCEDURE — G0463: CPT

## 2024-11-20 PROCEDURE — 80048 BASIC METABOLIC PNL TOTAL CA: CPT

## 2024-11-20 PROCEDURE — 85027 COMPLETE CBC AUTOMATED: CPT

## (undated) DEVICE — CATH IV SAFE BC 20G X 1.16" (PINK)

## (undated) DEVICE — CATH IV SAFE BC 22G X 1" (BLUE)

## (undated) DEVICE — ATTACHMENT DISTAL 4X13.4MM

## (undated) DEVICE — TUBING IV SET GRAVITY 3Y 100" MACRO

## (undated) DEVICE — SYR ALLIANCE II INFLATION 60ML

## (undated) DEVICE — SOL INJ NS 0.9% 500ML 2 PORT

## (undated) DEVICE — PROBE FIAPC CIRC O.D. 2.3MM/6.9FR LNTH 220CM/7.2FT

## (undated) DEVICE — BALLOON US ENDO

## (undated) DEVICE — TUBING SUCTION 20FT

## (undated) DEVICE — FOLEY HOLDER STATLOCK 2 WAY ADULT

## (undated) DEVICE — SENSOR O2 FINGER ADULT

## (undated) DEVICE — TUBING SUCTION CONN 6FT STERILE

## (undated) DEVICE — SUCTION YANKAUER NO CONTROL VENT

## (undated) DEVICE — PACK IV START WITH CHG

## (undated) DEVICE — BITE BLOCK ADULT 20 X 27MM (GREEN)